# Patient Record
Sex: FEMALE | Race: WHITE | NOT HISPANIC OR LATINO | URBAN - NONMETROPOLITAN AREA
[De-identification: names, ages, dates, MRNs, and addresses within clinical notes are randomized per-mention and may not be internally consistent; named-entity substitution may affect disease eponyms.]

---

## 2019-09-10 ENCOUNTER — HISTORICAL (OUTPATIENT)
Dept: ADMINISTRATIVE | Facility: HOSPITAL | Age: 39
End: 2019-09-10

## 2021-01-12 LAB
HUMAN PAPILLOMAVIRUS (HPV): NORMAL
PAP RECOMMENDATION EXT: NORMAL
PAP SMEAR: NORMAL
POC BETA-HCG (QUAL): NEGATIVE

## 2022-04-10 ENCOUNTER — HISTORICAL (OUTPATIENT)
Dept: ADMINISTRATIVE | Facility: HOSPITAL | Age: 42
End: 2022-04-10

## 2022-04-28 VITALS
DIASTOLIC BLOOD PRESSURE: 66 MMHG | WEIGHT: 111 LBS | OXYGEN SATURATION: 99 % | SYSTOLIC BLOOD PRESSURE: 112 MMHG | HEIGHT: 62 IN | BODY MASS INDEX: 20.43 KG/M2

## 2022-09-21 ENCOUNTER — HISTORICAL (OUTPATIENT)
Dept: ADMINISTRATIVE | Facility: HOSPITAL | Age: 42
End: 2022-09-21

## 2023-01-25 ENCOUNTER — DOCUMENTATION ONLY (OUTPATIENT)
Dept: ADMINISTRATIVE | Facility: HOSPITAL | Age: 43
End: 2023-01-25
Payer: COMMERCIAL

## 2023-02-13 ENCOUNTER — OFFICE VISIT (OUTPATIENT)
Dept: FAMILY MEDICINE | Facility: CLINIC | Age: 43
End: 2023-02-13
Payer: COMMERCIAL

## 2023-02-13 VITALS
TEMPERATURE: 98 F | RESPIRATION RATE: 20 BRPM | HEIGHT: 62 IN | HEART RATE: 81 BPM | DIASTOLIC BLOOD PRESSURE: 68 MMHG | OXYGEN SATURATION: 100 % | BODY MASS INDEX: 20.43 KG/M2 | SYSTOLIC BLOOD PRESSURE: 128 MMHG | WEIGHT: 111 LBS

## 2023-02-13 DIAGNOSIS — B37.31 VAGINA, CANDIDIASIS: Primary | ICD-10-CM

## 2023-02-13 DIAGNOSIS — R39.9 UTI SYMPTOMS: ICD-10-CM

## 2023-02-13 DIAGNOSIS — Z72.51 HIGH RISK SEXUAL BEHAVIOR, UNSPECIFIED TYPE: ICD-10-CM

## 2023-02-13 DIAGNOSIS — F41.9 ANXIETY: ICD-10-CM

## 2023-02-13 PROBLEM — E78.5 HYPERLIPIDEMIA: Status: ACTIVE | Noted: 2023-02-13

## 2023-02-13 PROBLEM — H60.549 ECZEMA OF EXTERNAL AUDITORY CANAL: Status: ACTIVE | Noted: 2023-02-13

## 2023-02-13 PROBLEM — H69.90 DYSFUNCTION OF EUSTACHIAN TUBE: Status: ACTIVE | Noted: 2023-02-13

## 2023-02-13 PROBLEM — J30.9 ALLERGIC RHINITIS: Status: ACTIVE | Noted: 2023-02-13

## 2023-02-13 LAB
BILIRUB SERPL-MCNC: NORMAL MG/DL
BLOOD URINE, POC: NEGATIVE
CLARITY, POC UA: NORMAL
COLOR, POC UA: NORMAL
GLUCOSE UR QL STRIP: NEGATIVE
KETONES UR QL STRIP: NEGATIVE
LEUKOCYTE ESTERASE URINE, POC: NEGATIVE
NITRITE, POC UA: NEGATIVE
PH, POC UA: 6
PROTEIN, POC: NORMAL
SPECIFIC GRAVITY, POC UA: >=1.03
UROBILINOGEN, POC UA: NORMAL

## 2023-02-13 PROCEDURE — 3078F DIAST BP <80 MM HG: CPT | Mod: CPTII,,, | Performed by: NURSE PRACTITIONER

## 2023-02-13 PROCEDURE — 3008F BODY MASS INDEX DOCD: CPT | Mod: CPTII,,, | Performed by: NURSE PRACTITIONER

## 2023-02-13 PROCEDURE — 81002 POCT URINE DIPSTICK WITHOUT MICROSCOPE: ICD-10-PCS | Mod: ,,, | Performed by: NURSE PRACTITIONER

## 2023-02-13 PROCEDURE — 99213 OFFICE O/P EST LOW 20 MIN: CPT | Mod: 25,,, | Performed by: NURSE PRACTITIONER

## 2023-02-13 PROCEDURE — 96372 PR INJECTION,THERAP/PROPH/DIAG2ST, IM OR SUBCUT: ICD-10-PCS | Mod: ,,, | Performed by: NURSE PRACTITIONER

## 2023-02-13 PROCEDURE — 3078F PR MOST RECENT DIASTOLIC BLOOD PRESSURE < 80 MM HG: ICD-10-PCS | Mod: CPTII,,, | Performed by: NURSE PRACTITIONER

## 2023-02-13 PROCEDURE — 96372 THER/PROPH/DIAG INJ SC/IM: CPT | Mod: ,,, | Performed by: NURSE PRACTITIONER

## 2023-02-13 PROCEDURE — 1159F MED LIST DOCD IN RCRD: CPT | Mod: CPTII,,, | Performed by: NURSE PRACTITIONER

## 2023-02-13 PROCEDURE — 3074F PR MOST RECENT SYSTOLIC BLOOD PRESSURE < 130 MM HG: ICD-10-PCS | Mod: CPTII,,, | Performed by: NURSE PRACTITIONER

## 2023-02-13 PROCEDURE — 1159F PR MEDICATION LIST DOCUMENTED IN MEDICAL RECORD: ICD-10-PCS | Mod: CPTII,,, | Performed by: NURSE PRACTITIONER

## 2023-02-13 PROCEDURE — 3074F SYST BP LT 130 MM HG: CPT | Mod: CPTII,,, | Performed by: NURSE PRACTITIONER

## 2023-02-13 PROCEDURE — 81002 URINALYSIS NONAUTO W/O SCOPE: CPT | Mod: ,,, | Performed by: NURSE PRACTITIONER

## 2023-02-13 PROCEDURE — 3008F PR BODY MASS INDEX (BMI) DOCUMENTED: ICD-10-PCS | Mod: CPTII,,, | Performed by: NURSE PRACTITIONER

## 2023-02-13 PROCEDURE — 1160F PR REVIEW ALL MEDS BY PRESCRIBER/CLIN PHARMACIST DOCUMENTED: ICD-10-PCS | Mod: CPTII,,, | Performed by: NURSE PRACTITIONER

## 2023-02-13 PROCEDURE — 1160F RVW MEDS BY RX/DR IN RCRD: CPT | Mod: CPTII,,, | Performed by: NURSE PRACTITIONER

## 2023-02-13 PROCEDURE — 99213 PR OFFICE/OUTPT VISIT, EST, LEVL III, 20-29 MIN: ICD-10-PCS | Mod: 25,,, | Performed by: NURSE PRACTITIONER

## 2023-02-13 RX ORDER — CEFTRIAXONE 1 G/1
1 INJECTION, POWDER, FOR SOLUTION INTRAMUSCULAR; INTRAVENOUS
Status: COMPLETED | OUTPATIENT
Start: 2023-02-13 | End: 2023-02-13

## 2023-02-13 RX ORDER — NYSTATIN 100000 U/G
CREAM TOPICAL 2 TIMES DAILY
Qty: 30 G | Refills: 0 | Status: SHIPPED | OUTPATIENT
Start: 2023-02-13 | End: 2023-03-13

## 2023-02-13 RX ORDER — ATORVASTATIN CALCIUM 20 MG/1
20 TABLET, FILM COATED ORAL
COMMUNITY
Start: 2022-11-09 | End: 2023-03-15

## 2023-02-13 RX ORDER — FLUCONAZOLE 150 MG/1
150 TABLET ORAL DAILY
Qty: 2 TABLET | Refills: 0 | Status: SHIPPED | OUTPATIENT
Start: 2023-02-13 | End: 2023-02-15

## 2023-02-13 RX ADMIN — CEFTRIAXONE 1 G: 1 INJECTION, POWDER, FOR SOLUTION INTRAMUSCULAR; INTRAVENOUS at 08:02

## 2023-02-13 NOTE — PROGRESS NOTES
Subjective:       Patient ID: Barb Telles is a 42 y.o. female.    Chief Complaint: Urinary Tract Infection (Frequent uti, burning. Started last week.)    She does have history of recurrent UTI's. No new laundry detergent, soda, and intaking water. She does have occasional bladder spasm and suprapubic tenderness.     Vaginal Itching  The patient's primary symptoms include genital itching and vaginal discharge. This is a new problem. The current episode started in the past 7 days. The problem occurs constantly. The problem has been gradually worsening. Pertinent negatives include no abdominal pain, back pain, chills, constipation, diarrhea, discolored urine, fever, flank pain, headaches, nausea or sore throat. The vaginal discharge was thick and white. Nothing aggravates the symptoms. She is sexually active. No, her partner does not have an STD. Her past medical history is significant for vaginosis.   Review of Systems   Constitutional:  Negative for activity change, appetite change, chills, fever and unexpected weight change.   HENT:  Negative for ear pain, hearing loss, sinus pressure/congestion and sore throat.    Gastrointestinal:  Negative for abdominal distention, abdominal pain, constipation, diarrhea and nausea.   Endocrine: Negative for cold intolerance and heat intolerance.   Genitourinary:  Positive for vaginal discharge. Negative for difficulty urinating, flank pain, menstrual irregularity and vaginal dryness.   Musculoskeletal:  Negative for arthralgias and back pain.   Allergic/Immunologic: Negative for environmental allergies, food allergies and immunocompromised state.   Neurological:  Negative for dizziness, weakness, headaches, coordination difficulties, memory loss and coordination difficulties.   Psychiatric/Behavioral:  Negative for sleep disturbance and suicidal ideas. The patient is not nervous/anxious.        Objective:      Physical Exam  Vitals and nursing note reviewed.    Constitutional:       Appearance: Normal appearance.   HENT:      Head: Normocephalic and atraumatic.      Right Ear: Tympanic membrane, ear canal and external ear normal.      Left Ear: Tympanic membrane, ear canal and external ear normal.      Nose: Nose normal.      Mouth/Throat:      Mouth: Mucous membranes are moist.      Pharynx: Oropharynx is clear.   Eyes:      Extraocular Movements: Extraocular movements intact.      Conjunctiva/sclera: Conjunctivae normal.      Pupils: Pupils are equal, round, and reactive to light.   Cardiovascular:      Rate and Rhythm: Normal rate and regular rhythm.      Pulses: Normal pulses.      Heart sounds: Normal heart sounds.   Pulmonary:      Effort: Pulmonary effort is normal.      Breath sounds: Normal breath sounds.   Abdominal:      General: Abdomen is flat. Bowel sounds are normal.      Palpations: Abdomen is soft.      Tenderness: There is abdominal tenderness in the suprapubic area.   Musculoskeletal:         General: Normal range of motion.      Cervical back: Normal range of motion.   Skin:     General: Skin is warm and dry.      Capillary Refill: Capillary refill takes less than 2 seconds.   Neurological:      General: No focal deficit present.      Mental Status: She is alert.   Psychiatric:         Mood and Affect: Mood normal.         Behavior: Behavior normal.         Thought Content: Thought content normal.         Judgment: Judgment normal.       Assessment/Plan:     Vitals:    02/13/23 0719   BP: 128/68   Pulse: 81   Resp: 20   Temp: 98.2 °F (36.8 °C)        1. Vagina, candidiasis  Assessment & Plan:  Diflucan 150mg [id number]     Orders:  -     fluconazole (DIFLUCAN) 150 MG Tab; Take 1 tablet (150 mg total) by mouth once daily. for 2 doses  Dispense: 2 tablet; Refill: 0  -     nystatin (MYCOSTATIN) cream; Apply topically 2 (two) times daily. for 14 days  Dispense: 30 g; Refill: 0    2. Anxiety  Assessment & Plan:  Will notify if worsens   With increase life  stressors  Denies medication or therapy       3. UTI symptoms  Assessment & Plan:  Negative luek and nitrates     Orders:  -     POCT URINE DIPSTICK WITHOUT MICROSCOPE           Follow up in about 4 weeks (around 3/13/2023) for Clinic Follow Up.   Discussed the diagnosis, prognosis, plan of care, chronic nature of and indications for, risks and benefits of treatment for conditions.  Continue all medications as prescribed unless otherwise noted.   Call if patient develops other symptoms or if not better in 2-3 days and sooner if worse. Emphasized the importance of compliance with all recommendations, including medication use and timely follow up. Instructed to return as noted be or sooner if patient develops any other problems or if there are any other additional questions or concerns.

## 2023-02-15 LAB
C TRACH RRNA SPEC QL NAA+PROBE: NEGATIVE
N GONORRHOEA RRNA SPEC QL NAA+PROBE: NEGATIVE

## 2023-02-16 ENCOUNTER — TELEPHONE (OUTPATIENT)
Dept: FAMILY MEDICINE | Facility: CLINIC | Age: 43
End: 2023-02-16
Payer: COMMERCIAL

## 2023-02-16 DIAGNOSIS — F41.9 SEVERE ANXIETY: Primary | ICD-10-CM

## 2023-02-16 RX ORDER — HYDROXYZINE PAMOATE 25 MG/1
25 CAPSULE ORAL EVERY 6 HOURS PRN
COMMUNITY
End: 2023-02-16 | Stop reason: SDUPTHER

## 2023-02-16 RX ORDER — HYDROXYZINE PAMOATE 25 MG/1
25 CAPSULE ORAL 4 TIMES DAILY
Qty: 84 CAPSULE | Refills: 0 | Status: SHIPPED | OUTPATIENT
Start: 2023-02-16 | End: 2023-03-09

## 2023-02-16 NOTE — TELEPHONE ENCOUNTER
----- Message from Sarah Henderson sent at 2/16/2023  3:27 PM CST -----  Regarding: Call out meds      Pt called crying that she is still having panic attacks and that she was told to call in.  I spoke to Meenakshi and she said that some thing will be called out for the patient.      [address]

## 2023-02-16 NOTE — TELEPHONE ENCOUNTER
----- Message from JOSH Durant sent at 2/15/2023  7:15 AM CST -----  Notify negative chlamydia and gonorrhea. Don't have trich result

## 2023-03-13 ENCOUNTER — OFFICE VISIT (OUTPATIENT)
Dept: FAMILY MEDICINE | Facility: CLINIC | Age: 43
End: 2023-03-13
Payer: COMMERCIAL

## 2023-03-13 VITALS
HEART RATE: 96 BPM | BODY MASS INDEX: 20.24 KG/M2 | DIASTOLIC BLOOD PRESSURE: 72 MMHG | WEIGHT: 110 LBS | TEMPERATURE: 97 F | HEIGHT: 62 IN | OXYGEN SATURATION: 100 % | RESPIRATION RATE: 18 BRPM | SYSTOLIC BLOOD PRESSURE: 124 MMHG

## 2023-03-13 DIAGNOSIS — H69.93 DYSFUNCTION OF BOTH EUSTACHIAN TUBES: ICD-10-CM

## 2023-03-13 DIAGNOSIS — E55.9 VITAMIN D DEFICIENCY: ICD-10-CM

## 2023-03-13 DIAGNOSIS — Z11.59 ENCOUNTER FOR HEPATITIS C SCREENING TEST FOR LOW RISK PATIENT: ICD-10-CM

## 2023-03-13 DIAGNOSIS — F17.210 CIGARETTE NICOTINE DEPENDENCE WITHOUT COMPLICATION: ICD-10-CM

## 2023-03-13 DIAGNOSIS — Z79.899 LONG TERM USE OF DRUG: ICD-10-CM

## 2023-03-13 DIAGNOSIS — H60.549 ECZEMA OF EXTERNAL AUDITORY CANAL: Primary | ICD-10-CM

## 2023-03-13 DIAGNOSIS — J30.2 OTHER SEASONAL ALLERGIC RHINITIS: ICD-10-CM

## 2023-03-13 DIAGNOSIS — F43.21 GRIEVING: ICD-10-CM

## 2023-03-13 DIAGNOSIS — F41.9 ANXIETY: ICD-10-CM

## 2023-03-13 DIAGNOSIS — E78.5 HYPERLIPIDEMIA, UNSPECIFIED HYPERLIPIDEMIA TYPE: ICD-10-CM

## 2023-03-13 PROBLEM — E78.2 MIXED HYPERLIPIDEMIA: Status: ACTIVE | Noted: 2023-02-13

## 2023-03-13 PROBLEM — R39.9 UTI SYMPTOMS: Status: RESOLVED | Noted: 2023-02-13 | Resolved: 2023-03-13

## 2023-03-13 PROCEDURE — 3008F PR BODY MASS INDEX (BMI) DOCUMENTED: ICD-10-PCS | Mod: CPTII,,, | Performed by: NURSE PRACTITIONER

## 2023-03-13 PROCEDURE — 99213 PR OFFICE/OUTPT VISIT, EST, LEVL III, 20-29 MIN: ICD-10-PCS | Mod: ,,, | Performed by: NURSE PRACTITIONER

## 2023-03-13 PROCEDURE — 1159F PR MEDICATION LIST DOCUMENTED IN MEDICAL RECORD: ICD-10-PCS | Mod: CPTII,,, | Performed by: NURSE PRACTITIONER

## 2023-03-13 PROCEDURE — 1159F MED LIST DOCD IN RCRD: CPT | Mod: CPTII,,, | Performed by: NURSE PRACTITIONER

## 2023-03-13 PROCEDURE — 1160F PR REVIEW ALL MEDS BY PRESCRIBER/CLIN PHARMACIST DOCUMENTED: ICD-10-PCS | Mod: CPTII,,, | Performed by: NURSE PRACTITIONER

## 2023-03-13 PROCEDURE — 3074F SYST BP LT 130 MM HG: CPT | Mod: CPTII,,, | Performed by: NURSE PRACTITIONER

## 2023-03-13 PROCEDURE — 3074F PR MOST RECENT SYSTOLIC BLOOD PRESSURE < 130 MM HG: ICD-10-PCS | Mod: CPTII,,, | Performed by: NURSE PRACTITIONER

## 2023-03-13 PROCEDURE — 1160F RVW MEDS BY RX/DR IN RCRD: CPT | Mod: CPTII,,, | Performed by: NURSE PRACTITIONER

## 2023-03-13 PROCEDURE — 99213 OFFICE O/P EST LOW 20 MIN: CPT | Mod: ,,, | Performed by: NURSE PRACTITIONER

## 2023-03-13 PROCEDURE — 3078F DIAST BP <80 MM HG: CPT | Mod: CPTII,,, | Performed by: NURSE PRACTITIONER

## 2023-03-13 PROCEDURE — 3008F BODY MASS INDEX DOCD: CPT | Mod: CPTII,,, | Performed by: NURSE PRACTITIONER

## 2023-03-13 PROCEDURE — 3078F PR MOST RECENT DIASTOLIC BLOOD PRESSURE < 80 MM HG: ICD-10-PCS | Mod: CPTII,,, | Performed by: NURSE PRACTITIONER

## 2023-03-13 RX ORDER — PSEUDOEPHEDRINE HYDROCHLORIDE 60 MG/1
60 TABLET ORAL 2 TIMES DAILY PRN
Qty: 32 TABLET | Refills: 0 | Status: SHIPPED | OUTPATIENT
Start: 2023-03-13 | End: 2023-04-12

## 2023-03-13 NOTE — PROGRESS NOTES
Subjective:       Patient ID: Barb Telles is a 42 y.o. female.    Chief Complaint: 1 month f/u UTI (No symptoms), Nasal Congestion, and Headache    Increased sinus congestion and pressure in past several weeks. Out of medications for past two weeks. Also increased allergies with sinus congestion for the past several months she is fasting at this time and had been taking the Lipitor until 2 weeks ago when she suddenly lost her father to a probable MI and her grandmother is currently given last rites with hospice and more quiet.  She does notice that she has enough people to be able to talk to at this time even though she is more sad.  She would like to have her labs checked for any kind of screening if any potentials but she is low risk for hepatitis-C at this time.    Hyperlipidemia  This is a chronic problem. The current episode started more than 1 year ago. There are no known factors aggravating her hyperlipidemia. Pertinent negatives include no chest pain or shortness of breath. Current antihyperlipidemic treatment includes statins.   Review of Systems   Constitutional:  Negative for activity change and appetite change.   HENT:  Positive for postnasal drip. Negative for nasal congestion, ear discharge, ear pain, trouble swallowing and voice change.    Eyes: Negative.  Negative for visual disturbance.   Respiratory: Negative.  Negative for chest tightness, shortness of breath and wheezing.    Cardiovascular:  Negative for chest pain, palpitations and leg swelling.   Endocrine: Negative for cold intolerance, heat intolerance and polyuria.   Genitourinary:  Negative for bladder incontinence, difficulty urinating, flank pain, frequency, vaginal discharge and vaginal dryness.   Allergic/Immunologic: Negative for environmental allergies and food allergies.   Neurological:  Negative for vertigo, tremors, seizures, syncope, light-headedness, headaches, coordination difficulties and coordination difficulties.  "  Hematological:  Negative for adenopathy. Does not bruise/bleed easily.   Psychiatric/Behavioral:  Negative for agitation, sleep disturbance and suicidal ideas.        Objective:      Vitals:    03/13/23 0854   BP: 124/72   Pulse: 96   Resp: 18   Temp: 97 °F (36.1 °C)   TempSrc: Temporal   SpO2: 100%   Weight: 49.9 kg (110 lb)   Height: 5' 2" (1.575 m)       Physical Exam  Vitals and nursing note reviewed.   Constitutional:       Appearance: Normal appearance. She is normal weight.   HENT:      Head: Normocephalic.      Right Ear: Hearing, ear canal and external ear normal. Tympanic membrane is bulging.      Left Ear: Hearing, ear canal and external ear normal. Tympanic membrane is bulging.      Nose: Congestion and rhinorrhea present.      Mouth/Throat:      Mouth: Mucous membranes are dry.      Pharynx: Oropharynx is clear.   Eyes:      Extraocular Movements: Extraocular movements intact.      Conjunctiva/sclera: Conjunctivae normal.   Cardiovascular:      Rate and Rhythm: Normal rate and regular rhythm.      Pulses: Normal pulses.   Pulmonary:      Effort: Pulmonary effort is normal.      Breath sounds: Normal breath sounds.   Abdominal:      General: Abdomen is flat. Bowel sounds are normal.      Palpations: Abdomen is soft.   Musculoskeletal:         General: Normal range of motion.      Cervical back: Normal range of motion.   Skin:     General: Skin is warm and dry.   Neurological:      General: No focal deficit present.      Mental Status: She is alert.   Psychiatric:         Mood and Affect: Mood normal.       Assessment/Plan:     1. Eczema of external auditory canal    2. Hyperlipidemia, unspecified hyperlipidemia type    3. Other seasonal allergic rhinitis  -     pseudoephedrine (SUDAFED) 60 MG tablet; Take 1 tablet (60 mg total) by mouth 2 (two) times daily as needed for Congestion.  Dispense: 32 tablet; Refill: 0    4. Anxiety    5. Grieving  Assessment & Plan:  Grandmother currently with hospice. " Recently father sudden death.       6. Dysfunction of both eustachian tubes    7. Cigarette nicotine dependence without complication  Overview:  Declines smoking cessation program or assistance at this time.          Follow up in about 3 months (around 6/13/2023).          Discussed the diagnosis, prognosis, plan of care, chronic nature of and indications for, risks and benefits of treatment for conditions.  Continue all medications as prescribed unless otherwise noted.   Call if patient develops other symptoms or if not better in 2-3 days and sooner if worse. Emphasized the importance of compliance with all recommendations, including medication use and timely follow up. Instructed to return as noted be or sooner if patient develops any other problems or if there are any other additional questions or concerns.

## 2023-03-14 LAB
25(OH)D3+25(OH)D2 SERPL-MCNC: 10.4 NG/ML (ref 30–100)
ALBUMIN SERPL-MCNC: 4.5 G/DL (ref 3.8–4.8)
ALBUMIN/GLOB SERPL: 2 {RATIO} (ref 1.2–2.2)
ALP SERPL-CCNC: 50 IU/L (ref 44–121)
ALT SERPL-CCNC: 13 IU/L (ref 0–32)
AST SERPL-CCNC: 14 IU/L (ref 0–40)
BASOPHILS # BLD AUTO: 0 X10E3/UL (ref 0–0.2)
BASOPHILS NFR BLD AUTO: 0 %
BILIRUB SERPL-MCNC: 0.5 MG/DL (ref 0–1.2)
BUN SERPL-MCNC: 8 MG/DL (ref 6–24)
BUN/CREAT SERPL: 16 (ref 9–23)
CALCIUM SERPL-MCNC: 9.5 MG/DL (ref 8.7–10.2)
CHLORIDE SERPL-SCNC: 105 MMOL/L (ref 96–106)
CHOLEST SERPL-MCNC: 247 MG/DL (ref 100–199)
CO2 SERPL-SCNC: 19 MMOL/L (ref 20–29)
CREAT SERPL-MCNC: 0.51 MG/DL (ref 0.57–1)
EOSINOPHIL # BLD AUTO: 0.5 X10E3/UL (ref 0–0.4)
EOSINOPHIL NFR BLD AUTO: 7 %
ERYTHROCYTE [DISTWIDTH] IN BLOOD BY AUTOMATED COUNT: 13 % (ref 11.7–15.4)
EST. GFR  (NO RACE VARIABLE): 120 ML/MIN/1.73
GLOBULIN SER CALC-MCNC: 2.2 G/DL (ref 1.5–4.5)
GLUCOSE SERPL-MCNC: 90 MG/DL (ref 70–99)
HCT VFR BLD AUTO: 40.3 % (ref 34–46.6)
HCV IGG SERPL QL IA: NON REACTIVE
HDLC SERPL-MCNC: 44 MG/DL
HGB BLD-MCNC: 13.7 G/DL (ref 11.1–15.9)
IMM GRANULOCYTES NFR BLD AUTO: 0 %
LDLC SERPL CALC-MCNC: 187 MG/DL (ref 0–99)
LYMPHOCYTES # BLD AUTO: 1.6 X10E3/UL (ref 0.7–3.1)
LYMPHOCYTES NFR BLD AUTO: 21 %
MCH RBC QN AUTO: 29.5 PG (ref 26.6–33)
MCHC RBC AUTO-ENTMCNC: 34 G/DL (ref 31.5–35.7)
MCV RBC AUTO: 87 FL (ref 79–97)
MONOCYTES # BLD AUTO: 0.5 X10E3/UL (ref 0.1–0.9)
MONOCYTES NFR BLD AUTO: 6 %
NEUTROPHILS # BLD AUTO: 4.9 X10E3/UL (ref 1.4–7)
NEUTROPHILS NFR BLD AUTO: 66 %
PLATELET # BLD AUTO: 191 X10E3/UL (ref 150–450)
POTASSIUM SERPL-SCNC: 4.1 MMOL/L (ref 3.5–5.2)
PROT SERPL-MCNC: 6.7 G/DL (ref 6–8.5)
RBC # BLD AUTO: 4.64 X10E6/UL (ref 3.77–5.28)
SODIUM SERPL-SCNC: 140 MMOL/L (ref 134–144)
TRIGL SERPL-MCNC: 90 MG/DL (ref 0–149)
TSH SERPL DL<=0.005 MIU/L-ACNC: 1.89 UIU/ML (ref 0.45–4.5)
VLDLC SERPL CALC-MCNC: 16 MG/DL (ref 5–40)
WBC # BLD AUTO: 7.5 X10E3/UL (ref 3.4–10.8)

## 2023-03-14 NOTE — PROGRESS NOTES
Normal lab with very elevated cholesterol LDL at 187 goal <100, increase lipitor 40 mg qhs,  vitamin D only 10, begin 50,000 twice week for 90 days. Allergies otherwise normal cbc, negative Hep C infection. Thyroid good, normal liver, kidney, no diabetes, no anemia, recheck CMP, FLP, vitamin D in 3 month unless problems.

## 2023-03-15 ENCOUNTER — TELEPHONE (OUTPATIENT)
Dept: FAMILY MEDICINE | Facility: CLINIC | Age: 43
End: 2023-03-15
Payer: COMMERCIAL

## 2023-03-15 DIAGNOSIS — E55.9 VITAMIN D DEFICIENCY: ICD-10-CM

## 2023-03-15 DIAGNOSIS — E78.2 MIXED HYPERLIPIDEMIA: Primary | ICD-10-CM

## 2023-03-15 RX ORDER — ATORVASTATIN CALCIUM 40 MG/1
40 TABLET, FILM COATED ORAL DAILY
Qty: 90 TABLET | Refills: 0 | Status: SHIPPED | OUTPATIENT
Start: 2023-03-15 | End: 2024-01-24

## 2023-03-15 RX ORDER — ERGOCALCIFEROL 1.25 MG/1
50000 CAPSULE ORAL
Qty: 8 CAPSULE | Refills: 0 | Status: SHIPPED | OUTPATIENT
Start: 2023-03-16 | End: 2023-06-14

## 2023-03-15 NOTE — TELEPHONE ENCOUNTER
----- Message from Lucero English DNP sent at 3/14/2023  6:21 PM CDT -----  Normal lab with very elevated cholesterol LDL at 187 goal <100, increase lipitor 40 mg qhs,  vitamin D only 10, begin 50,000 twice week for 90 days. Allergies otherwise normal cbc, negative Hep C infection. Thyroid good, normal liver, kidney, no diabetes, no anemia, recheck CMP, FLP, vitamin D in 3 month unless problems.

## 2023-11-29 ENCOUNTER — OFFICE VISIT (OUTPATIENT)
Dept: FAMILY MEDICINE | Facility: CLINIC | Age: 43
End: 2023-11-29
Payer: COMMERCIAL

## 2023-11-29 VITALS
OXYGEN SATURATION: 99 % | HEART RATE: 107 BPM | RESPIRATION RATE: 20 BRPM | BODY MASS INDEX: 19.88 KG/M2 | SYSTOLIC BLOOD PRESSURE: 102 MMHG | HEIGHT: 62 IN | TEMPERATURE: 99 F | WEIGHT: 108 LBS | DIASTOLIC BLOOD PRESSURE: 68 MMHG

## 2023-11-29 DIAGNOSIS — E78.2 MIXED HYPERLIPIDEMIA: ICD-10-CM

## 2023-11-29 DIAGNOSIS — J06.9 UPPER RESPIRATORY TRACT INFECTION, UNSPECIFIED TYPE: Primary | ICD-10-CM

## 2023-11-29 DIAGNOSIS — E55.9 VITAMIN D DEFICIENCY: ICD-10-CM

## 2023-11-29 DIAGNOSIS — F17.210 CIGARETTE NICOTINE DEPENDENCE WITHOUT COMPLICATION: ICD-10-CM

## 2023-11-29 PROCEDURE — 3074F PR MOST RECENT SYSTOLIC BLOOD PRESSURE < 130 MM HG: ICD-10-PCS | Mod: CPTII,,, | Performed by: NURSE PRACTITIONER

## 2023-11-29 PROCEDURE — 96372 THER/PROPH/DIAG INJ SC/IM: CPT | Mod: ,,, | Performed by: NURSE PRACTITIONER

## 2023-11-29 PROCEDURE — 3008F BODY MASS INDEX DOCD: CPT | Mod: CPTII,,, | Performed by: NURSE PRACTITIONER

## 2023-11-29 PROCEDURE — 3078F DIAST BP <80 MM HG: CPT | Mod: CPTII,,, | Performed by: NURSE PRACTITIONER

## 2023-11-29 PROCEDURE — 3074F SYST BP LT 130 MM HG: CPT | Mod: CPTII,,, | Performed by: NURSE PRACTITIONER

## 2023-11-29 PROCEDURE — 3078F PR MOST RECENT DIASTOLIC BLOOD PRESSURE < 80 MM HG: ICD-10-PCS | Mod: CPTII,,, | Performed by: NURSE PRACTITIONER

## 2023-11-29 PROCEDURE — 99213 OFFICE O/P EST LOW 20 MIN: CPT | Mod: 25,,, | Performed by: NURSE PRACTITIONER

## 2023-11-29 PROCEDURE — 3008F PR BODY MASS INDEX (BMI) DOCUMENTED: ICD-10-PCS | Mod: CPTII,,, | Performed by: NURSE PRACTITIONER

## 2023-11-29 PROCEDURE — 96372 PR INJECTION,THERAP/PROPH/DIAG2ST, IM OR SUBCUT: ICD-10-PCS | Mod: ,,, | Performed by: NURSE PRACTITIONER

## 2023-11-29 PROCEDURE — 99213 PR OFFICE/OUTPT VISIT, EST, LEVL III, 20-29 MIN: ICD-10-PCS | Mod: 25,,, | Performed by: NURSE PRACTITIONER

## 2023-11-29 PROCEDURE — 1159F MED LIST DOCD IN RCRD: CPT | Mod: CPTII,,, | Performed by: NURSE PRACTITIONER

## 2023-11-29 PROCEDURE — 1159F PR MEDICATION LIST DOCUMENTED IN MEDICAL RECORD: ICD-10-PCS | Mod: CPTII,,, | Performed by: NURSE PRACTITIONER

## 2023-11-29 RX ORDER — DEXAMETHASONE SODIUM PHOSPHATE 100 MG/10ML
10 INJECTION INTRAMUSCULAR; INTRAVENOUS
Status: COMPLETED | OUTPATIENT
Start: 2023-11-29 | End: 2023-11-29

## 2023-11-29 RX ADMIN — DEXAMETHASONE SODIUM PHOSPHATE 10 MG: 100 INJECTION INTRAMUSCULAR; INTRAVENOUS at 02:11

## 2023-11-29 NOTE — PROGRESS NOTES
"Subjective:       Patient ID: Barb Telles is a 43 y.o. female.    Chief Complaint: Cough (Runny nose and congestion)    Patient presents with complaint of sinus congestion.  Began approximately 9 days ago with sinus congestion low-grade fever cough with severe rhinorrhea and it did better the improve but stopped taking medicine now still experiencing especially frontal sinus congestion.  Not running a fever only a slight cough smoking 1/2 pack a day but does not feel any chest congestion at this time.      Review of Systems   Constitutional:  Negative for activity change and appetite change.   HENT:  Positive for nasal congestion and rhinorrhea. Negative for trouble swallowing and voice change.    Eyes: Negative.  Negative for visual disturbance.   Respiratory: Negative.  Negative for chest tightness, shortness of breath and wheezing.    Cardiovascular:  Negative for chest pain, palpitations and leg swelling.   Gastrointestinal: Negative.    Endocrine: Negative for cold intolerance, heat intolerance and polyuria.   Genitourinary:  Negative for bladder incontinence, difficulty urinating, flank pain and frequency.   Allergic/Immunologic: Negative for environmental allergies and food allergies.   Neurological:  Negative for vertigo, tremors, seizures, syncope, light-headedness, headaches and coordination difficulties.   Hematological:  Negative for adenopathy. Does not bruise/bleed easily.   Psychiatric/Behavioral:  Negative for agitation, sleep disturbance and suicidal ideas.          Objective:      Vitals:    11/29/23 1432   BP: 102/68   Pulse: 107   Resp: 20   Temp: 99.2 °F (37.3 °C)   SpO2: 99%   Weight: 49 kg (108 lb)   Height: 5' 2" (1.575 m)       Physical Exam  Vitals and nursing note reviewed.   Constitutional:       Appearance: Normal appearance.   HENT:      Head: Normocephalic.      Nose: Congestion and rhinorrhea present.      Mouth/Throat:      Mouth: Mucous membranes are dry.   Eyes:      " Extraocular Movements: Extraocular movements intact.      Conjunctiva/sclera: Conjunctivae normal.   Cardiovascular:      Rate and Rhythm: Normal rate and regular rhythm.      Pulses: Normal pulses.   Pulmonary:      Effort: Pulmonary effort is normal.      Breath sounds: Normal breath sounds.   Abdominal:      General: Bowel sounds are normal.      Palpations: Abdomen is soft.   Musculoskeletal:         General: Normal range of motion.      Cervical back: Normal range of motion.   Skin:     General: Skin is warm and dry.      Capillary Refill: Capillary refill takes less than 2 seconds.   Neurological:      General: No focal deficit present.      Mental Status: She is alert.   Psychiatric:         Mood and Affect: Mood normal.         Assessment/Plan:     1. Upper respiratory tract infection, unspecified type  Assessment & Plan:  Aprodine, nasal irrigation, and notify any worsening or congestion.     Orders:  -     dexAMETHasone injection 10 mg    2. Mixed hyperlipidemia  Assessment & Plan:  Restart lipitor 20 mg every other day. Add Co Q10 if needed for myalgia      3. Cigarette nicotine dependence without complication  Overview:  Declines smoking cessation program or assistance at this time.     Assessment & Plan:  Not ready to quit smoking at this time.       4. Vitamin D deficiency  Assessment & Plan:  Begin resuming vitamin D daily, recheck cmp, flp, vitamin D in 3 months.       Other orders  -     triprolidine-pseudoephedrine (APRODINE) 2.5-60 mg Tab; Take 1 tablet by mouth every 6 (six) hours as needed (nasal congestion).  Dispense: 30 tablet; Refill: 0       Follow up in about 3 months (around 2/29/2024).          Discussed the diagnosis, prognosis, plan of care, chronic nature of and indications for, risks and benefits of treatment for conditions.  Continue all medications as prescribed unless otherwise noted.   Call if patient develops other symptoms or if not better in 2-3 days and sooner if worse.  Emphasized the importance of compliance with all recommendations, including medication use and timely follow up. Instructed to return as noted be or sooner if patient develops any other problems or if there are any other additional questions or concerns.

## 2024-01-24 ENCOUNTER — OFFICE VISIT (OUTPATIENT)
Dept: FAMILY MEDICINE | Facility: CLINIC | Age: 44
End: 2024-01-24
Payer: COMMERCIAL

## 2024-01-24 VITALS
WEIGHT: 108 LBS | HEART RATE: 82 BPM | OXYGEN SATURATION: 100 % | DIASTOLIC BLOOD PRESSURE: 68 MMHG | HEIGHT: 62 IN | TEMPERATURE: 98 F | SYSTOLIC BLOOD PRESSURE: 128 MMHG | BODY MASS INDEX: 19.88 KG/M2 | RESPIRATION RATE: 18 BRPM

## 2024-01-24 DIAGNOSIS — E78.2 MIXED HYPERLIPIDEMIA: ICD-10-CM

## 2024-01-24 DIAGNOSIS — L02.91 ABSCESS: Primary | ICD-10-CM

## 2024-01-24 DIAGNOSIS — E55.9 VITAMIN D DEFICIENCY: ICD-10-CM

## 2024-01-24 PROCEDURE — 3008F BODY MASS INDEX DOCD: CPT | Mod: CPTII,,, | Performed by: NURSE PRACTITIONER

## 2024-01-24 PROCEDURE — 3074F SYST BP LT 130 MM HG: CPT | Mod: CPTII,,, | Performed by: NURSE PRACTITIONER

## 2024-01-24 PROCEDURE — 1159F MED LIST DOCD IN RCRD: CPT | Mod: CPTII,,, | Performed by: NURSE PRACTITIONER

## 2024-01-24 PROCEDURE — 3078F DIAST BP <80 MM HG: CPT | Mod: CPTII,,, | Performed by: NURSE PRACTITIONER

## 2024-01-24 PROCEDURE — 99213 OFFICE O/P EST LOW 20 MIN: CPT | Mod: ,,, | Performed by: NURSE PRACTITIONER

## 2024-01-24 RX ORDER — CHOLECALCIFEROL (VITAMIN D3) 125 MCG
5000 CAPSULE ORAL DAILY
Qty: 90 CAPSULE | Refills: 0 | Status: SHIPPED | OUTPATIENT
Start: 2024-01-24

## 2024-01-24 RX ORDER — MUPIROCIN 20 MG/G
OINTMENT TOPICAL 3 TIMES DAILY
Qty: 22 G | Refills: 0 | Status: SHIPPED | OUTPATIENT
Start: 2024-01-24 | End: 2024-05-06

## 2024-01-24 RX ORDER — SULFAMETHOXAZOLE AND TRIMETHOPRIM 800; 160 MG/1; MG/1
1 TABLET ORAL 2 TIMES DAILY
Qty: 20 TABLET | Refills: 0 | Status: SHIPPED | OUTPATIENT
Start: 2024-01-24 | End: 2024-02-03

## 2024-01-24 RX ORDER — ATORVASTATIN CALCIUM 20 MG/1
20 TABLET, FILM COATED ORAL DAILY
Qty: 90 TABLET | Refills: 1 | Status: SHIPPED | OUTPATIENT
Start: 2024-01-24 | End: 2024-05-06

## 2024-01-24 NOTE — PROGRESS NOTES
"SUBJECTIVE:  Barb Telles is a 43 y.o. female here alone for abscess to left breast.    HPI  Patient presents with abscess to left breast with ingrown hair inside. Red, raised with pus under skin. Denies fever and pain. States only itches. Needs refill on atorvastatin.    Steffs allergies, medications, history, and problem list were updated as appropriate.    Review of Systems   Skin:  Positive for wound.      A comprehensive review of symptoms was completed and negative except as noted above.    OBJECTIVE:  Vital signs  Vitals:    01/24/24 1606   BP: 128/68   BP Location: Right arm   Patient Position: Sitting   Pulse: 82   Resp: 18   Temp: 98.4 °F (36.9 °C)   TempSrc: Temporal   SpO2: 100%   Weight: 49 kg (108 lb)   Height: 5' 2" (1.575 m)        Physical Exam  Constitutional:       Appearance: Normal appearance.   HENT:      Head: Normocephalic and atraumatic.      Nose: Nose normal.      Mouth/Throat:      Mouth: Mucous membranes are moist.      Pharynx: Oropharynx is clear.   Eyes:      Conjunctiva/sclera: Conjunctivae normal.   Cardiovascular:      Rate and Rhythm: Normal rate and regular rhythm.   Pulmonary:      Effort: Pulmonary effort is normal.      Breath sounds: Normal breath sounds.   Abdominal:      General: Bowel sounds are normal.      Palpations: Abdomen is soft.   Skin:     General: Skin is warm.      Capillary Refill: Capillary refill takes less than 2 seconds.      Comments: Dime sized superficial abscess to left breast.  Small amount of pus drained with palpation.   Neurological:      Mental Status: She is alert.   Psychiatric:         Mood and Affect: Mood normal.         Behavior: Behavior normal.         Judgment: Judgment normal.         ASSESSMENT/PLAN:    1. Abscess  Comments:  keep wound clean and dry, cover if draining then keep open to air  Orders:  -     sulfamethoxazole-trimethoprim 800-160mg (BACTRIM DS) 800-160 mg Tab; Take 1 tablet by mouth 2 (two) times daily. for 10 days  " Dispense: 20 tablet; Refill: 0  -     mupirocin (BACTROBAN) 2 % ointment; Apply topically 3 (three) times daily.  Dispense: 22 g; Refill: 0    2. Mixed hyperlipidemia  -     atorvastatin (LIPITOR) 20 MG tablet; Take 1 tablet (20 mg total) by mouth once daily.  Dispense: 90 tablet; Refill: 1    3. Vitamin D deficiency  -     cholecalciferol, vitamin D3, 125 mcg (5,000 unit) capsule; Take 1 capsule (5,000 Units total) by mouth once daily.  Dispense: 90 capsule; Refill: 0          No results found for this or any previous visit (from the past 24 hour(s)).    Follow Up:  Follow up if symptoms worsen or fail to improve.      Discussed the diagnosis, prognosis, plan of care, chronic nature of and indications for, risks and benefits of treatment for conditions.  Continue all medications as prescribed unless otherwise noted.   Call if patient develops other symptoms or if not better in 2-3 days and sooner if worse. Emphasized the importance of compliance with all recommendations, including medication use and timely follow up. Instructed to return as noted be or sooner if patient develops any other problems or if there are any other additional questions or concerns.

## 2024-02-07 ENCOUNTER — OFFICE VISIT (OUTPATIENT)
Dept: FAMILY MEDICINE | Facility: CLINIC | Age: 44
End: 2024-02-07
Payer: COMMERCIAL

## 2024-02-07 VITALS
RESPIRATION RATE: 20 BRPM | HEART RATE: 88 BPM | DIASTOLIC BLOOD PRESSURE: 71 MMHG | TEMPERATURE: 98 F | SYSTOLIC BLOOD PRESSURE: 104 MMHG | HEIGHT: 62 IN | WEIGHT: 108 LBS | OXYGEN SATURATION: 98 % | BODY MASS INDEX: 19.88 KG/M2

## 2024-02-07 DIAGNOSIS — N39.0 URINARY TRACT INFECTION WITH HEMATURIA, SITE UNSPECIFIED: ICD-10-CM

## 2024-02-07 DIAGNOSIS — R31.9 URINARY TRACT INFECTION WITH HEMATURIA, SITE UNSPECIFIED: ICD-10-CM

## 2024-02-07 DIAGNOSIS — R10.9 ABDOMINAL PAIN, UNSPECIFIED ABDOMINAL LOCATION: ICD-10-CM

## 2024-02-07 DIAGNOSIS — N39.0 RECURRENT UTI (URINARY TRACT INFECTION): Primary | ICD-10-CM

## 2024-02-07 DIAGNOSIS — B37.31 VAGINA, CANDIDIASIS: ICD-10-CM

## 2024-02-07 DIAGNOSIS — R39.9 UTI SYMPTOMS: ICD-10-CM

## 2024-02-07 LAB
BILIRUB SERPL-MCNC: NORMAL MG/DL
BLOOD URINE, POC: NORMAL
CLARITY, POC UA: CLEAR
COLOR, POC UA: YELLOW
GLUCOSE UR QL STRIP: NEGATIVE
KETONES UR QL STRIP: NEGATIVE
LEUKOCYTE ESTERASE URINE, POC: NORMAL
NITRITE, POC UA: NEGATIVE
PH, POC UA: 6
PROTEIN, POC: NORMAL
SPECIFIC GRAVITY, POC UA: >=1.03
UROBILINOGEN, POC UA: 1

## 2024-02-07 PROCEDURE — 3074F SYST BP LT 130 MM HG: CPT | Mod: CPTII,,, | Performed by: NURSE PRACTITIONER

## 2024-02-07 PROCEDURE — 3008F BODY MASS INDEX DOCD: CPT | Mod: CPTII,,, | Performed by: NURSE PRACTITIONER

## 2024-02-07 PROCEDURE — 81002 URINALYSIS NONAUTO W/O SCOPE: CPT | Mod: ,,, | Performed by: NURSE PRACTITIONER

## 2024-02-07 PROCEDURE — 3078F DIAST BP <80 MM HG: CPT | Mod: CPTII,,, | Performed by: NURSE PRACTITIONER

## 2024-02-07 PROCEDURE — 1160F RVW MEDS BY RX/DR IN RCRD: CPT | Mod: CPTII,,, | Performed by: NURSE PRACTITIONER

## 2024-02-07 PROCEDURE — 99214 OFFICE O/P EST MOD 30 MIN: CPT | Mod: 25,,, | Performed by: NURSE PRACTITIONER

## 2024-02-07 PROCEDURE — 1159F MED LIST DOCD IN RCRD: CPT | Mod: CPTII,,, | Performed by: NURSE PRACTITIONER

## 2024-02-07 PROCEDURE — 96372 THER/PROPH/DIAG INJ SC/IM: CPT | Mod: ,,, | Performed by: NURSE PRACTITIONER

## 2024-02-07 RX ORDER — FLUCONAZOLE 150 MG/1
150 TABLET ORAL DAILY
Qty: 1 TABLET | Refills: 0 | Status: SHIPPED | OUTPATIENT
Start: 2024-02-07 | End: 2024-02-08

## 2024-02-07 RX ORDER — KETOROLAC TROMETHAMINE 30 MG/ML
60 INJECTION, SOLUTION INTRAMUSCULAR; INTRAVENOUS
Status: COMPLETED | OUTPATIENT
Start: 2024-02-07 | End: 2024-02-07

## 2024-02-07 RX ORDER — METHENAMINE, SODIUM PHOSPHATE MONOBASIC, PHENYL SALICYLATE, METHYLENE BLUE, HYOSCYAMINE SULFATE 81.6; 40.8; 36.2; 10.8; .12 MG/1; MG/1; MG/1; MG/1; MG/1
1 TABLET ORAL 4 TIMES DAILY PRN
Qty: 24 TABLET | Refills: 1 | Status: SHIPPED | OUTPATIENT
Start: 2024-02-07 | End: 2024-05-06

## 2024-02-07 RX ADMIN — KETOROLAC TROMETHAMINE 60 MG: 30 INJECTION, SOLUTION INTRAMUSCULAR; INTRAVENOUS at 10:02

## 2024-02-07 NOTE — PROGRESS NOTES
"Subjective:       Patient ID: Barb Telles is a 43 y.o. female.    Chief Complaint: possible uti (Urgency, frequent urination. Just finished bactrim for ingrown hair to breast. )    Patient was taking bactrim for past 8 days but last three days has been left breast folliculitis. Felt discomfort with urination and urgency, but now experiencing increased suprapubic constant pressure and pain. Completed menses yesterday.       Review of Systems   Constitutional:  Negative for activity change, appetite change and fever.   HENT:  Negative for nasal congestion, trouble swallowing and voice change.    Eyes: Negative.  Negative for visual disturbance.   Respiratory: Negative.  Negative for chest tightness, shortness of breath and wheezing.    Cardiovascular:  Negative for chest pain, palpitations and leg swelling.   Gastrointestinal:  Positive for abdominal pain.   Endocrine: Negative for cold intolerance, heat intolerance and polyuria.   Genitourinary:  Positive for pelvic pain. Negative for bladder incontinence, difficulty urinating, flank pain, frequency, vaginal bleeding and vaginal discharge.   Allergic/Immunologic: Negative for environmental allergies and food allergies.   Neurological:  Negative for vertigo, tremors, seizures, syncope, light-headedness, headaches and coordination difficulties.   Hematological:  Negative for adenopathy. Does not bruise/bleed easily.   Psychiatric/Behavioral:  Negative for agitation, sleep disturbance and suicidal ideas.          Objective:      Vitals:    02/07/24 0956   BP: 104/71   Pulse: 88   Resp: 20   Temp: 98.3 °F (36.8 °C)   SpO2: 98%   Weight: 49 kg (108 lb)   Height: 5' 2" (1.575 m)       Physical Exam  Vitals and nursing note reviewed.   Constitutional:       General: She is not in acute distress.     Appearance: She is not ill-appearing.   HENT:      Head: Normocephalic and atraumatic.      Mouth/Throat:      Mouth: Mucous membranes are moist.      Pharynx: " Oropharynx is clear.   Eyes:      General: No scleral icterus.     Extraocular Movements: Extraocular movements intact.      Conjunctiva/sclera: Conjunctivae normal.      Pupils: Pupils are equal, round, and reactive to light.   Neck:      Vascular: No carotid bruit.   Cardiovascular:      Rate and Rhythm: Normal rate and regular rhythm.      Pulses: Normal pulses.      Heart sounds: Normal heart sounds. No murmur heard.     No friction rub. No gallop.   Pulmonary:      Effort: Pulmonary effort is normal. No respiratory distress.      Breath sounds: Normal breath sounds. No wheezing, rhonchi or rales.   Abdominal:      General: Abdomen is flat. Bowel sounds are normal. There is no distension.      Palpations: Abdomen is soft. There is no mass.      Tenderness: There is abdominal tenderness in the suprapubic area.   Musculoskeletal:         General: Normal range of motion.      Cervical back: Normal range of motion and neck supple.   Skin:     General: Skin is warm and dry.   Neurological:      General: No focal deficit present.      Mental Status: She is alert and oriented to person, place, and time.   Psychiatric:         Mood and Affect: Mood normal.         Assessment/Plan:     1. Recurrent UTI (urinary tract infection)  Assessment & Plan:  Was taking bactrim but within last 3 days. Now constant suprapubic pain and pressure. Emptying bladder well. Has seen Dr Cooper, urologist and will await cultures before starting antibiotic. Urimar T. Toradol for pain and treat yeast infection.     Orders:  -     POCT urine dipstick without microscope  -     Urine culture    2. Urinary tract infection with hematuria, site unspecified  -     POCT urine dipstick without microscope  -     Urine culture    3. Vagina, candidiasis  Assessment & Plan:  Diflucan 150 mg today and repeat in 3 days. Send urine for yeast infection.     Orders:  -     POCT urine dipstick without microscope  -     fluconazole (DIFLUCAN) 150 MG Tab; Take 1  tablet (150 mg total) by mouth once daily. for 1 day  Dispense: 1 tablet; Refill: 0    4. UTI symptoms    Other orders  -     methen-m.blue-s.phos-phsal-hyo (UTIRA-C) 81.6-10.8-40.8 mg Tab; Take 1 each by mouth 4 (four) times daily as needed (spasm, dysuria).  Dispense: 24 tablet; Refill: 1       Follow up in about 4 weeks (around 3/6/2024).          Discussed the diagnosis, prognosis, plan of care, chronic nature of and indications for, risks and benefits of treatment for conditions.  Continue all medications as prescribed unless otherwise noted.   Call if patient develops other symptoms or if not better in 2-3 days and sooner if worse. Emphasized the importance of compliance with all recommendations, including medication use and timely follow up. Instructed to return as noted be or sooner if patient develops any other problems or if there are any other additional questions or concerns.

## 2024-02-07 NOTE — ASSESSMENT & PLAN NOTE
Was taking bactrim but within last 3 days. Now constant suprapubic pain and pressure. Emptying bladder well. Has seen Dr Cooper, urologist and will await cultures before starting antibiotic. Marjan MARTINEZ Toradol for pain and treat yeast infection.

## 2024-02-09 LAB
BACTERIA UR CULT: NORMAL
BACTERIA UR CULT: NORMAL

## 2024-02-14 ENCOUNTER — TELEPHONE (OUTPATIENT)
Dept: FAMILY MEDICINE | Facility: CLINIC | Age: 44
End: 2024-02-14
Payer: COMMERCIAL

## 2024-02-14 NOTE — TELEPHONE ENCOUNTER
----- Message from Lucero English DNP sent at 2/12/2024  6:38 AM CST -----  Notify patient no growth in urine eat could be the interstitial cystitis as discussed during visit would need to have follow-up with urologist unless she is feeling better more likely symptoms from irritation and possibly yeast infection but still awaiting urine for yeast growth.  Continue current treatment

## 2024-02-14 NOTE — TELEPHONE ENCOUNTER
Pt notified and states that she started feeling better 2 days after. States that she wants to hold off on urologist.

## 2024-02-17 LAB — YEAST SPEC QL CULT: NORMAL

## 2024-02-19 ENCOUNTER — TELEPHONE (OUTPATIENT)
Dept: FAMILY MEDICINE | Facility: CLINIC | Age: 44
End: 2024-02-19
Payer: COMMERCIAL

## 2024-02-19 NOTE — TELEPHONE ENCOUNTER
----- Message from Lucero English DNP sent at 2/19/2024  6:26 AM CST -----  Notify no culture of urine recommend strongly follow-up with Urology if symptoms persist

## 2024-05-06 ENCOUNTER — OFFICE VISIT (OUTPATIENT)
Dept: FAMILY MEDICINE | Facility: CLINIC | Age: 44
End: 2024-05-06
Payer: COMMERCIAL

## 2024-05-06 VITALS
RESPIRATION RATE: 20 BRPM | TEMPERATURE: 98 F | OXYGEN SATURATION: 100 % | HEART RATE: 90 BPM | SYSTOLIC BLOOD PRESSURE: 122 MMHG | BODY MASS INDEX: 19.14 KG/M2 | HEIGHT: 62 IN | WEIGHT: 104 LBS | DIASTOLIC BLOOD PRESSURE: 68 MMHG

## 2024-05-06 DIAGNOSIS — E78.2 MIXED HYPERLIPIDEMIA: ICD-10-CM

## 2024-05-06 DIAGNOSIS — E55.9 VITAMIN D DEFICIENCY: ICD-10-CM

## 2024-05-06 DIAGNOSIS — F17.210 CIGARETTE NICOTINE DEPENDENCE WITHOUT COMPLICATION: ICD-10-CM

## 2024-05-06 DIAGNOSIS — N39.0 RECURRENT UTI (URINARY TRACT INFECTION): ICD-10-CM

## 2024-05-06 DIAGNOSIS — Z12.31 SCREENING MAMMOGRAM, ENCOUNTER FOR: Primary | ICD-10-CM

## 2024-05-06 LAB
BILIRUB SERPL-MCNC: NEGATIVE MG/DL
BLOOD URINE, POC: NEGATIVE
CLARITY, POC UA: NORMAL
COLOR, POC UA: NORMAL
GLUCOSE UR QL STRIP: NEGATIVE
KETONES UR QL STRIP: NORMAL
LEUKOCYTE ESTERASE URINE, POC: NEGATIVE
NITRITE, POC UA: NEGATIVE
PH, POC UA: 5.5
PROTEIN, POC: NEGATIVE
SPECIFIC GRAVITY, POC UA: >=1.03
UROBILINOGEN, POC UA: NORMAL

## 2024-05-06 PROCEDURE — 3008F BODY MASS INDEX DOCD: CPT | Mod: CPTII,,, | Performed by: NURSE PRACTITIONER

## 2024-05-06 PROCEDURE — 99214 OFFICE O/P EST MOD 30 MIN: CPT | Mod: ,,, | Performed by: NURSE PRACTITIONER

## 2024-05-06 PROCEDURE — 81002 URINALYSIS NONAUTO W/O SCOPE: CPT | Mod: ,,, | Performed by: NURSE PRACTITIONER

## 2024-05-06 PROCEDURE — 1159F MED LIST DOCD IN RCRD: CPT | Mod: CPTII,,, | Performed by: NURSE PRACTITIONER

## 2024-05-06 PROCEDURE — 3078F DIAST BP <80 MM HG: CPT | Mod: CPTII,,, | Performed by: NURSE PRACTITIONER

## 2024-05-06 PROCEDURE — 3074F SYST BP LT 130 MM HG: CPT | Mod: CPTII,,, | Performed by: NURSE PRACTITIONER

## 2024-05-06 PROCEDURE — 1160F RVW MEDS BY RX/DR IN RCRD: CPT | Mod: CPTII,,, | Performed by: NURSE PRACTITIONER

## 2024-05-06 RX ORDER — ROSUVASTATIN CALCIUM 10 MG/1
10 TABLET, COATED ORAL DAILY
Qty: 90 TABLET | Refills: 3 | Status: SHIPPED | OUTPATIENT
Start: 2024-05-06 | End: 2024-05-06

## 2024-05-06 NOTE — ASSESSMENT & PLAN NOTE
Vitamin D 5000 mg daily. Will notify with results of lab draw when available. Continue current treatment until results available.

## 2024-05-06 NOTE — ASSESSMENT & PLAN NOTE
Not taking the Lipitor every night because taking it in the evening is difficult with schedule missing it proximally twice a week and can be more.  Trying to eat healthy not experiencing any side effects with the medications.  We will change to a medication that can be taken in the morning and notify with lab results when available.

## 2024-05-06 NOTE — PROGRESS NOTES
"SUBJECTIVE:  Barb Telles is a 43 y.o. female here alone for breast exam and recheck urine      HPI  Pt in clinic for breast exam and recheck urine. Pt reports that she is doing better. Finished abx. She is also due for mammo gram. Pt is also needing fasting labs. History interstitial cystitis with Dr Junior Cooper.  In the past had told that it was the the lining of the bladder that was irritated and recommended cranberry as long as she drinks this and avoid caffeine she does better.  She does have irritation symptoms most days.    Steffs allergies, medications, history, and problem list were updated as appropriate.    Review of Systems   Respiratory: Negative.     Cardiovascular: Negative.    Gastrointestinal: Negative.    Genitourinary:  Positive for hematuria. Negative for decreased urine volume, difficulty urinating, dyspareunia, dysuria, frequency, menstrual problem, pelvic pain, urgency and vaginal bleeding.   All other systems reviewed and are negative.     A comprehensive review of symptoms was completed and negative except as noted above.    OBJECTIVE:  Vital signs  Vitals:    05/06/24 0831   BP: 122/68   BP Location: Left arm   Patient Position: Sitting   Pulse: 90   Resp: 20   Temp: 97.7 °F (36.5 °C)   SpO2: 100%   Weight: 47.2 kg (104 lb)   Height: 5' 2" (1.575 m)        Physical Exam  Vitals and nursing note reviewed.   Constitutional:       General: She is not in acute distress.     Appearance: She is not ill-appearing.   HENT:      Head: Normocephalic and atraumatic.      Mouth/Throat:      Mouth: Mucous membranes are moist.      Pharynx: Oropharynx is clear.   Eyes:      General: No scleral icterus.     Extraocular Movements: Extraocular movements intact.      Conjunctiva/sclera: Conjunctivae normal.      Pupils: Pupils are equal, round, and reactive to light.        Comments: Raised flesh colored raised papules   Neck:      Vascular: No carotid bruit.   Cardiovascular:      Rate and Rhythm: " Normal rate and regular rhythm.      Pulses: Normal pulses.      Heart sounds: Normal heart sounds. No murmur heard.     No friction rub. No gallop.   Pulmonary:      Effort: Pulmonary effort is normal. No respiratory distress.      Breath sounds: Normal breath sounds. No wheezing, rhonchi or rales.   Abdominal:      General: Abdomen is flat. Bowel sounds are normal. There is no distension.      Palpations: Abdomen is soft. There is no mass.      Tenderness: There is abdominal tenderness in the suprapubic area.   Musculoskeletal:         General: Normal range of motion.      Cervical back: Normal range of motion and neck supple.   Skin:     General: Skin is warm and dry.   Neurological:      General: No focal deficit present.      Mental Status: She is alert and oriented to person, place, and time.   Psychiatric:         Mood and Affect: Mood normal.          No visits with results within 1 Day(s) from this visit.   Latest known visit with results is:   Orders Only on 02/07/2024   Component Date Value Ref Range Status    Yeast Only, Culture 02/07/2024 Comment   Final    No yeast recovered after seven (7) days.          ASSESSMENT/PLAN:    1. Screening mammogram, encounter for  -     Mammo Digital Screening Bilat w/ Davin; Future; Expected date: 05/06/2024    2. Mixed hyperlipidemia  Assessment & Plan:  Not taking the Lipitor every night because taking it in the evening is difficult with schedule missing it proximally twice a week and can be more.  Trying to eat healthy not experiencing any side effects with the medications.  We will change to a medication that can be taken in the morning and notify with lab results when available.    Orders:  -     Discontinue: rosuvastatin (CRESTOR) 10 MG tablet; Take 1 tablet (10 mg total) by mouth once daily.  Dispense: 90 tablet; Refill: 3  -     Comprehensive Metabolic Panel  -     Lipid Panel    3. Vitamin D deficiency  Assessment & Plan:  Vitamin D 5000 mg daily. Will notify  with results of lab draw when available. Continue current treatment until results available.       Orders:  -     Vitamin D    4. Recurrent UTI (urinary tract infection)  Assessment & Plan:  Reports no symptoms unless drinking caffeine. Feeling no symptoms. Will notify with results of lab draw when available. Continue current treatment until results available.         5. Cigarette nicotine dependence without complication  Overview:  Declines smoking cessation program or assistance at this time. It is very important that she quit smoking. There are various alternatives available to help with this difficult task, but first and foremost, she must make a firm commitment and decision to quit. The nature of nicotine addiction is discussed. The usefulness of behavioral therapy is discussed and suggested.  The correct use, cost and side effects of nicotine replacement therapy such as gum or patches is discussed. Bupropion and its cost (sometimes not covered fully by insurance) and side effects are reviewed. The quit rates are discussed. I recommend she not allow potential costs of treatment to deter her from using nicotine replacement therapy or bupropion, as the long term economic and health benefits are obvious.              No results found for this or any previous visit (from the past 24 hour(s)).    Follow Up:  Follow up in about 3 months (around 8/6/2024).      Discussed the diagnosis, prognosis, plan of care, chronic nature of and indications for, risks and benefits of treatment for conditions.  Continue all medications as prescribed unless otherwise noted.   Call if patient develops other symptoms or if not better in 2-3 days and sooner if worse. Emphasized the importance of compliance with all recommendations, including medication use and timely follow up. Instructed to return as noted be or sooner if patient develops any other problems or if there are any other additional questions or concerns.

## 2024-05-06 NOTE — ASSESSMENT & PLAN NOTE
Reports no symptoms unless drinking caffeine. Feeling no symptoms. Will notify with results of lab draw when available. Continue current treatment until results available.

## 2024-05-07 ENCOUNTER — TELEPHONE (OUTPATIENT)
Dept: FAMILY MEDICINE | Facility: CLINIC | Age: 44
End: 2024-05-07
Payer: COMMERCIAL

## 2024-05-07 LAB
25(OH)D3+25(OH)D2 SERPL-MCNC: 27.5 NG/ML (ref 30–100)
ALBUMIN SERPL-MCNC: 4.6 G/DL (ref 3.9–4.9)
ALBUMIN/GLOB SERPL: 2 {RATIO} (ref 1.2–2.2)
ALP SERPL-CCNC: 56 IU/L (ref 44–121)
ALT SERPL-CCNC: 8 IU/L (ref 0–32)
AST SERPL-CCNC: 11 IU/L (ref 0–40)
BILIRUB SERPL-MCNC: 0.5 MG/DL (ref 0–1.2)
BUN SERPL-MCNC: 14 MG/DL (ref 6–24)
BUN/CREAT SERPL: 26 (ref 9–23)
CALCIUM SERPL-MCNC: 9.5 MG/DL (ref 8.7–10.2)
CHLORIDE SERPL-SCNC: 107 MMOL/L (ref 96–106)
CHOLEST SERPL-MCNC: 148 MG/DL (ref 100–199)
CO2 SERPL-SCNC: 19 MMOL/L (ref 20–29)
CREAT SERPL-MCNC: 0.54 MG/DL (ref 0.57–1)
EST. GFR  (NO RACE VARIABLE): 118 ML/MIN/1.73
GLOBULIN SER CALC-MCNC: 2.3 G/DL (ref 1.5–4.5)
GLUCOSE SERPL-MCNC: 89 MG/DL (ref 70–99)
HDLC SERPL-MCNC: 40 MG/DL
LDLC SERPL CALC-MCNC: 93 MG/DL (ref 0–99)
POTASSIUM SERPL-SCNC: 3.9 MMOL/L (ref 3.5–5.2)
PROT SERPL-MCNC: 6.9 G/DL (ref 6–8.5)
SODIUM SERPL-SCNC: 142 MMOL/L (ref 134–144)
TRIGL SERPL-MCNC: 75 MG/DL (ref 0–149)
VLDLC SERPL CALC-MCNC: 15 MG/DL (ref 5–40)

## 2024-05-07 NOTE — TELEPHONE ENCOUNTER
----- Message from Lucero English DNP sent at 5/7/2024  6:44 AM CDT -----  Notify patient vitamin-D too low but better than year ago continue to increase vitamin-D supplementation adding in the additional 1000 IU 6000 daily.  Cholesterol greatly improved from last time recheck CMP FLP vitamin-D with wellness labs in six-month

## 2024-05-13 PROBLEM — N39.0 RECURRENT UTI (URINARY TRACT INFECTION): Status: RESOLVED | Noted: 2024-02-07 | Resolved: 2024-05-13

## 2024-06-21 ENCOUNTER — HOSPITAL ENCOUNTER (OUTPATIENT)
Dept: RADIOLOGY | Facility: HOSPITAL | Age: 44
Discharge: HOME | End: 2024-06-21
Attending: NURSE PRACTITIONER
Payer: COMMERCIAL

## 2024-06-21 DIAGNOSIS — Z12.31 SCREENING MAMMOGRAM, ENCOUNTER FOR: ICD-10-CM

## 2024-06-21 PROCEDURE — 77067 SCR MAMMO BI INCL CAD: CPT | Mod: TC

## 2024-07-03 ENCOUNTER — HOSPITAL ENCOUNTER (OUTPATIENT)
Dept: RADIOLOGY | Facility: HOSPITAL | Age: 44
Discharge: HOME | End: 2024-07-03
Attending: NURSE PRACTITIONER
Payer: COMMERCIAL

## 2024-07-03 DIAGNOSIS — R92.8 ABNORMAL MAMMOGRAM: ICD-10-CM

## 2024-07-03 PROCEDURE — 77061 BREAST TOMOSYNTHESIS UNI: CPT | Mod: 26,RT,, | Performed by: STUDENT IN AN ORGANIZED HEALTH CARE EDUCATION/TRAINING PROGRAM

## 2024-07-03 PROCEDURE — 77061 BREAST TOMOSYNTHESIS UNI: CPT | Mod: TC,RT

## 2024-07-03 PROCEDURE — 76642 ULTRASOUND BREAST LIMITED: CPT | Mod: TC,RT

## 2024-07-03 PROCEDURE — 76642 ULTRASOUND BREAST LIMITED: CPT | Mod: 26,RT,, | Performed by: STUDENT IN AN ORGANIZED HEALTH CARE EDUCATION/TRAINING PROGRAM

## 2024-07-03 PROCEDURE — 77065 DX MAMMO INCL CAD UNI: CPT | Mod: 26,RT,, | Performed by: STUDENT IN AN ORGANIZED HEALTH CARE EDUCATION/TRAINING PROGRAM

## 2024-07-08 ENCOUNTER — TELEPHONE (OUTPATIENT)
Dept: FAMILY MEDICINE | Facility: CLINIC | Age: 44
End: 2024-07-08
Payer: COMMERCIAL

## 2024-07-08 NOTE — TELEPHONE ENCOUNTER
----- Message from Lucero English DNP sent at 7/8/2024  5:55 AM CDT -----  Benign cyst right breast with no evidence malignancy on mammogram, reschedule in one year for breast exam and re screening mammogram unless changes

## 2024-07-22 ENCOUNTER — OFFICE VISIT (OUTPATIENT)
Dept: FAMILY MEDICINE | Facility: CLINIC | Age: 44
End: 2024-07-22
Payer: COMMERCIAL

## 2024-07-22 VITALS
HEIGHT: 62 IN | OXYGEN SATURATION: 100 % | SYSTOLIC BLOOD PRESSURE: 129 MMHG | RESPIRATION RATE: 18 BRPM | DIASTOLIC BLOOD PRESSURE: 79 MMHG | TEMPERATURE: 97 F | WEIGHT: 105 LBS | HEART RATE: 77 BPM | BODY MASS INDEX: 19.32 KG/M2

## 2024-07-22 DIAGNOSIS — N39.0 URINARY TRACT INFECTION WITHOUT HEMATURIA, SITE UNSPECIFIED: Primary | ICD-10-CM

## 2024-07-22 LAB
BILIRUB SERPL-MCNC: NORMAL MG/DL
BLOOD URINE, POC: NEGATIVE
CLARITY, POC UA: NORMAL
COLOR, POC UA: YELLOW
GLUCOSE UR QL STRIP: NEGATIVE
KETONES UR QL STRIP: NEGATIVE
LEUKOCYTE ESTERASE URINE, POC: NORMAL
NITRITE, POC UA: NEGATIVE
PH, POC UA: 6
PROTEIN, POC: NEGATIVE
SPECIFIC GRAVITY, POC UA: >=1.03
UROBILINOGEN, POC UA: 0.2

## 2024-07-22 PROCEDURE — 81002 URINALYSIS NONAUTO W/O SCOPE: CPT | Mod: ,,, | Performed by: NURSE PRACTITIONER

## 2024-07-22 PROCEDURE — 1160F RVW MEDS BY RX/DR IN RCRD: CPT | Mod: CPTII,,, | Performed by: NURSE PRACTITIONER

## 2024-07-22 PROCEDURE — 99214 OFFICE O/P EST MOD 30 MIN: CPT | Mod: ,,, | Performed by: NURSE PRACTITIONER

## 2024-07-22 PROCEDURE — 3078F DIAST BP <80 MM HG: CPT | Mod: CPTII,,, | Performed by: NURSE PRACTITIONER

## 2024-07-22 PROCEDURE — 1159F MED LIST DOCD IN RCRD: CPT | Mod: CPTII,,, | Performed by: NURSE PRACTITIONER

## 2024-07-22 PROCEDURE — 3074F SYST BP LT 130 MM HG: CPT | Mod: CPTII,,, | Performed by: NURSE PRACTITIONER

## 2024-07-22 PROCEDURE — 3008F BODY MASS INDEX DOCD: CPT | Mod: CPTII,,, | Performed by: NURSE PRACTITIONER

## 2024-07-22 RX ORDER — ROSUVASTATIN CALCIUM 10 MG/1
10 TABLET, COATED ORAL NIGHTLY
COMMUNITY
Start: 2024-05-06

## 2024-07-22 RX ORDER — PHENAZOPYRIDINE HYDROCHLORIDE 200 MG/1
200 TABLET, FILM COATED ORAL 3 TIMES DAILY PRN
Qty: 9 TABLET | Refills: 0 | Status: SHIPPED | OUTPATIENT
Start: 2024-07-22 | End: 2024-07-25

## 2024-07-22 RX ORDER — NITROFURANTOIN 25; 75 MG/1; MG/1
100 CAPSULE ORAL 2 TIMES DAILY
Qty: 10 CAPSULE | Refills: 0 | Status: SHIPPED | OUTPATIENT
Start: 2024-07-22 | End: 2024-07-25

## 2024-07-22 NOTE — PROGRESS NOTES
"SUBJECTIVE:  Barb Telles is a 43 y.o. female here alone for UTI.    Urinary Tract Infection   This is a new problem. The current episode started in the past 7 days. The problem occurs every urination. The problem has been gradually worsening. The quality of the pain is described as burning. The pain is at a severity of 0/10. The patient is experiencing no pain. There has been no fever. She is Sexually active. There is No history of pyelonephritis. Associated symptoms include a discharge, frequency and urgency. She has tried nothing for the symptoms. The treatment provided no relief.     Patient presents with symptoms of UTI. Positive for pain with urination, urinary frequency, urinary urgency. Denies fever. Symptoms started Friday.    Steffs allergies, medications, history, and problem list were updated as appropriate.    Review of Systems   Genitourinary:  Positive for frequency and urgency.      A comprehensive review of symptoms was completed and negative except as noted above.    OBJECTIVE:  Vital signs  Vitals:    07/22/24 0944   BP: 129/79   BP Location: Right arm   Patient Position: Sitting   Pulse: 77   Resp: 18   Temp: 96.8 °F (36 °C)   TempSrc: Temporal   SpO2: 100%   Weight: 47.6 kg (105 lb)   Height: 5' 2" (1.575 m)        Physical Exam  Vitals and nursing note reviewed.   Constitutional:       Appearance: Normal appearance.   HENT:      Head: Normocephalic and atraumatic.      Right Ear: Tympanic membrane, ear canal and external ear normal.      Left Ear: Tympanic membrane, ear canal and external ear normal.      Nose: Nose normal.      Mouth/Throat:      Mouth: Mucous membranes are moist.      Pharynx: Oropharynx is clear.   Eyes:      Extraocular Movements: Extraocular movements intact.      Conjunctiva/sclera: Conjunctivae normal.      Pupils: Pupils are equal, round, and reactive to light.   Cardiovascular:      Rate and Rhythm: Normal rate and regular rhythm.      Pulses: Normal pulses.     "  Heart sounds: Normal heart sounds.   Pulmonary:      Effort: Pulmonary effort is normal.      Breath sounds: Normal breath sounds.   Abdominal:      General: Abdomen is flat. Bowel sounds are normal.      Palpations: Abdomen is soft.      Tenderness: There is abdominal tenderness in the suprapubic area.   Musculoskeletal:         General: Normal range of motion.      Cervical back: Normal range of motion.   Skin:     General: Skin is warm and dry.      Capillary Refill: Capillary refill takes less than 2 seconds.   Neurological:      General: No focal deficit present.      Mental Status: She is alert.   Psychiatric:         Mood and Affect: Mood normal.         Behavior: Behavior normal.         Thought Content: Thought content normal.         Judgment: Judgment normal.          Office Visit on 07/22/2024   Component Date Value Ref Range Status    Glucose, UA 07/22/2024 negative   Final    Bilirubin, POC 07/22/2024 small   Final    Ketones, UA 07/22/2024 negative   Final    Spec Grav UA 07/22/2024 >=1.030   Final    Blood, UA 07/22/2024 negative   Final    pH, UA 07/22/2024 6.0   Final    Protein, POC 07/22/2024 negative   Final    Urobilinogen, UA 07/22/2024 0.2   Final    Nitrite, UA 07/22/2024 negative   Final    WBC, UA 07/22/2024 small   Final    Color, UA 07/22/2024 Yellow   Final    Clarity, UA 07/22/2024 Slightly Cloudy   Final          ASSESSMENT/PLAN:    1. Urinary tract infection without hematuria, site unspecified  Assessment & Plan:  Send for culture  Begin macrobid [id number]  Pyridium [id number]     Orders:  -     POCT urine dipstick without microscope  -     nitrofurantoin, macrocrystal-monohydrate, (MACROBID) 100 MG capsule; Take 1 capsule (100 mg total) by mouth 2 (two) times daily. for 5 days  Dispense: 10 capsule; Refill: 0  -     phenazopyridine (PYRIDIUM) 200 MG tablet; Take 1 tablet (200 mg total) by mouth 3 (three) times daily as needed for Pain.  Dispense: 9 tablet; Refill: 0  -     Urine  culture          Recent Results (from the past 24 hour(s))   POCT urine dipstick without microscope    Collection Time: 07/22/24 10:02 AM   Result Value Ref Range    Glucose, UA negative     Bilirubin, POC small     Ketones, UA negative     Spec Grav UA >=1.030     Blood, UA negative     pH, UA 6.0     Protein, POC negative     Urobilinogen, UA 0.2     Nitrite, UA negative     WBC, UA small     Color, UA Yellow     Clarity, UA Slightly Cloudy        Follow Up:  No follow-ups on file.      Discussed the diagnosis, prognosis, plan of care, chronic nature of and indications for, risks and benefits of treatment for conditions.  Continue all medications as prescribed unless otherwise noted.   Call if patient develops other symptoms or if not better in 2-3 days and sooner if worse. Emphasized the importance of compliance with all recommendations, including medication use and timely follow up. Instructed to return as noted be or sooner if patient develops any other problems or if there are any other additional questions or concerns.

## 2024-07-24 LAB
BACTERIA UR CULT: ABNORMAL
BACTERIA UR CULT: ABNORMAL
OTHER ANTIBIOTIC SUSC ISLT: ABNORMAL

## 2024-07-25 ENCOUNTER — TELEPHONE (OUTPATIENT)
Dept: FAMILY MEDICINE | Facility: CLINIC | Age: 44
End: 2024-07-25
Payer: COMMERCIAL

## 2024-07-25 DIAGNOSIS — A49.8 E COLI INFECTION: Primary | ICD-10-CM

## 2024-07-25 RX ORDER — CIPROFLOXACIN 500 MG/1
500 TABLET ORAL EVERY 12 HOURS
Qty: 6 TABLET | Refills: 0 | Status: SHIPPED | OUTPATIENT
Start: 2024-07-25 | End: 2024-07-28

## 2024-07-25 NOTE — TELEPHONE ENCOUNTER
----- Message from JOSH Durant sent at 7/25/2024  6:31 AM CDT -----  Notify culture with e.coli. Is she still having symptoms? Can switch to cipro 500mg bidx3 days. Macrobid not listed on sensitivity

## 2024-07-30 ENCOUNTER — OFFICE VISIT (OUTPATIENT)
Dept: FAMILY MEDICINE | Facility: CLINIC | Age: 44
End: 2024-07-30
Payer: COMMERCIAL

## 2024-07-30 VITALS
TEMPERATURE: 99 F | DIASTOLIC BLOOD PRESSURE: 64 MMHG | BODY MASS INDEX: 19.51 KG/M2 | WEIGHT: 106 LBS | RESPIRATION RATE: 20 BRPM | HEIGHT: 62 IN | SYSTOLIC BLOOD PRESSURE: 110 MMHG | OXYGEN SATURATION: 96 % | HEART RATE: 69 BPM

## 2024-07-30 DIAGNOSIS — R39.9 UTI SYMPTOMS: Primary | ICD-10-CM

## 2024-07-30 DIAGNOSIS — E55.9 VITAMIN D DEFICIENCY: ICD-10-CM

## 2024-07-30 DIAGNOSIS — B37.31 VAGINA, CANDIDIASIS: ICD-10-CM

## 2024-07-30 DIAGNOSIS — N30.00 ACUTE CYSTITIS WITHOUT HEMATURIA: ICD-10-CM

## 2024-07-30 PROBLEM — N39.0 URINARY TRACT INFECTION WITHOUT HEMATURIA: Status: RESOLVED | Noted: 2024-07-22 | Resolved: 2024-07-30

## 2024-07-30 LAB
BILIRUB SERPL-MCNC: NEGATIVE MG/DL
BLOOD URINE, POC: NEGATIVE
CLARITY, POC UA: NORMAL
COLOR, POC UA: NORMAL
GLUCOSE UR QL STRIP: NEGATIVE
KETONES UR QL STRIP: NORMAL
LEUKOCYTE ESTERASE URINE, POC: NORMAL
NITRITE, POC UA: NEGATIVE
PH, POC UA: 7
PROTEIN, POC: NEGATIVE
SPECIFIC GRAVITY, POC UA: >=1.03
UROBILINOGEN, POC UA: NORMAL

## 2024-07-30 PROCEDURE — 81002 URINALYSIS NONAUTO W/O SCOPE: CPT | Mod: ,,, | Performed by: NURSE PRACTITIONER

## 2024-07-30 PROCEDURE — 1160F RVW MEDS BY RX/DR IN RCRD: CPT | Mod: CPTII,,, | Performed by: NURSE PRACTITIONER

## 2024-07-30 PROCEDURE — 1159F MED LIST DOCD IN RCRD: CPT | Mod: CPTII,,, | Performed by: NURSE PRACTITIONER

## 2024-07-30 PROCEDURE — 99213 OFFICE O/P EST LOW 20 MIN: CPT | Mod: ,,, | Performed by: NURSE PRACTITIONER

## 2024-07-30 PROCEDURE — 3078F DIAST BP <80 MM HG: CPT | Mod: CPTII,,, | Performed by: NURSE PRACTITIONER

## 2024-07-30 PROCEDURE — 3008F BODY MASS INDEX DOCD: CPT | Mod: CPTII,,, | Performed by: NURSE PRACTITIONER

## 2024-07-30 PROCEDURE — 3074F SYST BP LT 130 MM HG: CPT | Mod: CPTII,,, | Performed by: NURSE PRACTITIONER

## 2024-07-30 RX ORDER — FLUCONAZOLE 150 MG/1
150 TABLET ORAL DAILY
Qty: 2 TABLET | Refills: 0 | Status: SHIPPED | OUTPATIENT
Start: 2024-07-30

## 2024-07-30 RX ORDER — CIPROFLOXACIN 500 MG/1
500 TABLET ORAL
COMMUNITY
End: 2024-07-30 | Stop reason: ALTCHOICE

## 2024-07-30 NOTE — PROGRESS NOTES
"SUBJECTIVE:  Barb Telles is a 43 y.o. female here for possible uti     HPI  Patient in clinic with uti symptoms. Also complains of yeast infection. Patient was recently seen on 7/22 with uti. Was given macrobid and pyridium. Patient was told to take macrobid for 5 days. Patient also had urine cultured. Patient was called back with results. Was told to stop macrobid and would send out cipro due to macrobid not listed as sensitivity. Seen urologist in past. Was told to take cranberry supplements. Made it worse. So started taking cranberry juice and helped. Patient states that she is still taking it.     Steffs allergies, medications, history, and problem list were updated as appropriate.    Review of Systems   Genitourinary:  Positive for dysuria and vaginal discharge. Negative for decreased urine volume, difficulty urinating, dyspareunia, enuresis, flank pain, frequency, genital sores, hematuria, menstrual problem, pelvic pain, urgency, vaginal bleeding and vaginal pain.   All other systems reviewed and are negative.     A comprehensive review of symptoms was completed and negative except as noted above.    OBJECTIVE:  Vital signs  Vitals:    07/30/24 1050   BP: 110/64   BP Location: Left arm   Patient Position: Sitting   Pulse: 69   Resp: 20   Temp: 98.6 °F (37 °C)   SpO2: 96%   Weight: 48.1 kg (106 lb)   Height: 5' 2" (1.575 m)        Physical Exam  Constitutional:       Appearance: Normal appearance.   HENT:      Head: Normocephalic.      Nose: Nose normal.      Mouth/Throat:      Mouth: Mucous membranes are dry.   Eyes:      Extraocular Movements: Extraocular movements intact.      Conjunctiva/sclera: Conjunctivae normal.   Cardiovascular:      Rate and Rhythm: Normal rate and regular rhythm.      Pulses: Normal pulses.      Heart sounds: Normal heart sounds.   Pulmonary:      Effort: Pulmonary effort is normal.      Breath sounds: Normal breath sounds.   Abdominal:      General: Bowel sounds are normal. " There is no distension.      Palpations: Abdomen is soft.      Tenderness: There is no abdominal tenderness. There is no right CVA tenderness, left CVA tenderness, guarding or rebound.   Musculoskeletal:         General: Normal range of motion.      Cervical back: Normal range of motion.   Skin:     General: Skin is warm and dry.      Capillary Refill: Capillary refill takes less than 2 seconds.   Neurological:      General: No focal deficit present.      Mental Status: She is alert.   Psychiatric:         Mood and Affect: Mood normal.          Office Visit on 07/30/2024   Component Date Value Ref Range Status    Glucose, UA 07/30/2024 negative   Final    Bilirubin, POC 07/30/2024 negative   Final    Ketones, UA 07/30/2024 trace   Final    Spec Grav UA 07/30/2024 >=1.030   Final    Blood, UA 07/30/2024 negative   Final    pH, UA 07/30/2024 7.0   Final    Protein, POC 07/30/2024 negative   Final    Urobilinogen, UA 07/30/2024 0.2 E.U./dl   Final    Nitrite, UA 07/30/2024 negative   Final    WBC, UA 07/30/2024 trace   Final    Color, UA 07/30/2024 Dark Yellow   Final    Clarity, UA 07/30/2024 Slightly Cloudy   Final          ASSESSMENT/PLAN:    1. UTI symptoms  -     POCT urine dipstick without microscope    2. Vagina, candidiasis  Assessment & Plan:  Symptoms began with irritation after after intercourse and took the medication for the infection those symptoms have cleared but now having increased vaginal burning itching vaginal discharge with irritation that is now affecting the bladder is well.  Discussed possible cultures but since it has worked so well in the past would like to continue this she usually resolved completely between symptoms.  Begin Diflucan 150 today and repeat again in 3 days.  If it recurs or becomes more continuous then consider cultures for yeast.       3. Acute cystitis without hematuria  Assessment & Plan:  Symptoms resolved after cipro for 3 days. No longer with symptoms.       4. Vitamin D  deficiency  Assessment & Plan:  Taking vitamin D 5000 weekly, worse after taking daily for some time. Now feeling better with once daily.             Recent Results (from the past 24 hour(s))   POCT urine dipstick without microscope    Collection Time: 07/30/24 10:58 AM   Result Value Ref Range    Glucose, UA negative     Bilirubin, POC negative     Ketones, UA trace     Spec Grav UA >=1.030     Blood, UA negative     pH, UA 7.0     Protein, POC negative     Urobilinogen, UA 0.2 E.U./dl     Nitrite, UA negative     WBC, UA trace     Color, UA Dark Yellow     Clarity, UA Slightly Cloudy        Follow Up:  Follow up if symptoms worsen or fail to improve.      Discussed the diagnosis, prognosis, plan of care, chronic nature of and indications for, risks and benefits of treatment for conditions.  Continue all medications as prescribed unless otherwise noted.   Call if patient develops other symptoms or if not better in 2-3 days and sooner if worse. Emphasized the importance of compliance with all recommendations, including medication use and timely follow up. Instructed to return as noted be or sooner if patient develops any other problems or if there are any other additional questions or concerns.

## 2024-07-30 NOTE — ASSESSMENT & PLAN NOTE
Symptoms began with irritation after after intercourse and took the medication for the infection those symptoms have cleared but now having increased vaginal burning itching vaginal discharge with irritation that is now affecting the bladder is well.  Discussed possible cultures but since it has worked so well in the past would like to continue this she usually resolved completely between symptoms.  Begin Diflucan 150 today and repeat again in 3 days.  If it recurs or becomes more continuous then consider cultures for yeast.

## 2024-07-30 NOTE — ASSESSMENT & PLAN NOTE
Taking vitamin D 5000 weekly, worse after taking daily for some time. Now feeling better with once daily.

## 2024-08-06 ENCOUNTER — OFFICE VISIT (OUTPATIENT)
Dept: FAMILY MEDICINE | Facility: CLINIC | Age: 44
End: 2024-08-06
Payer: COMMERCIAL

## 2024-08-06 VITALS
OXYGEN SATURATION: 99 % | WEIGHT: 107 LBS | BODY MASS INDEX: 19.69 KG/M2 | HEIGHT: 62 IN | TEMPERATURE: 98 F | HEART RATE: 91 BPM | RESPIRATION RATE: 20 BRPM | SYSTOLIC BLOOD PRESSURE: 118 MMHG | DIASTOLIC BLOOD PRESSURE: 68 MMHG

## 2024-08-06 DIAGNOSIS — E78.2 MIXED HYPERLIPIDEMIA: Primary | ICD-10-CM

## 2024-08-06 DIAGNOSIS — E55.9 VITAMIN D DEFICIENCY: ICD-10-CM

## 2024-08-06 PROBLEM — B37.31 VAGINA, CANDIDIASIS: Status: RESOLVED | Noted: 2023-02-13 | Resolved: 2024-08-06

## 2024-08-06 PROCEDURE — 99214 OFFICE O/P EST MOD 30 MIN: CPT | Mod: ,,, | Performed by: NURSE PRACTITIONER

## 2024-08-06 PROCEDURE — 1160F RVW MEDS BY RX/DR IN RCRD: CPT | Mod: CPTII,,, | Performed by: NURSE PRACTITIONER

## 2024-08-06 PROCEDURE — 3008F BODY MASS INDEX DOCD: CPT | Mod: CPTII,,, | Performed by: NURSE PRACTITIONER

## 2024-08-06 PROCEDURE — 3078F DIAST BP <80 MM HG: CPT | Mod: CPTII,,, | Performed by: NURSE PRACTITIONER

## 2024-08-06 PROCEDURE — 1159F MED LIST DOCD IN RCRD: CPT | Mod: CPTII,,, | Performed by: NURSE PRACTITIONER

## 2024-08-06 PROCEDURE — 3074F SYST BP LT 130 MM HG: CPT | Mod: CPTII,,, | Performed by: NURSE PRACTITIONER

## 2024-08-07 LAB
25(OH)D3+25(OH)D2 SERPL-MCNC: 25.2 NG/ML (ref 30–100)
ALBUMIN SERPL-MCNC: 4.8 G/DL (ref 3.9–4.9)
ALP SERPL-CCNC: 63 IU/L (ref 44–121)
ALT SERPL-CCNC: 18 IU/L (ref 0–32)
AST SERPL-CCNC: 18 IU/L (ref 0–40)
BILIRUB SERPL-MCNC: 0.2 MG/DL (ref 0–1.2)
BUN SERPL-MCNC: 11 MG/DL (ref 6–24)
BUN/CREAT SERPL: 17 (ref 9–23)
CALCIUM SERPL-MCNC: 9.5 MG/DL (ref 8.7–10.2)
CHLORIDE SERPL-SCNC: 105 MMOL/L (ref 96–106)
CHOLEST SERPL-MCNC: 187 MG/DL (ref 100–199)
CO2 SERPL-SCNC: 18 MMOL/L (ref 20–29)
CREAT SERPL-MCNC: 0.65 MG/DL (ref 0.57–1)
EST. GFR  (NO RACE VARIABLE): 113 ML/MIN/1.73
GLOBULIN SER CALC-MCNC: 2 G/DL (ref 1.5–4.5)
GLUCOSE SERPL-MCNC: 90 MG/DL (ref 70–99)
HDLC SERPL-MCNC: 46 MG/DL
LDLC SERPL CALC-MCNC: 120 MG/DL (ref 0–99)
POTASSIUM SERPL-SCNC: 4.1 MMOL/L (ref 3.5–5.2)
PROT SERPL-MCNC: 6.8 G/DL (ref 6–8.5)
SODIUM SERPL-SCNC: 144 MMOL/L (ref 134–144)
TRIGL SERPL-MCNC: 114 MG/DL (ref 0–149)
VLDLC SERPL CALC-MCNC: 21 MG/DL (ref 5–40)

## 2024-08-08 ENCOUNTER — TELEPHONE (OUTPATIENT)
Dept: FAMILY MEDICINE | Facility: CLINIC | Age: 44
End: 2024-08-08
Payer: COMMERCIAL

## 2024-08-13 ENCOUNTER — OFFICE VISIT (OUTPATIENT)
Dept: OBSTETRICS AND GYNECOLOGY | Facility: CLINIC | Age: 44
End: 2024-08-13
Payer: COMMERCIAL

## 2024-08-13 VITALS
BODY MASS INDEX: 19.25 KG/M2 | WEIGHT: 104.63 LBS | HEIGHT: 62 IN | SYSTOLIC BLOOD PRESSURE: 126 MMHG | DIASTOLIC BLOOD PRESSURE: 80 MMHG

## 2024-08-13 DIAGNOSIS — N30.01 ACUTE CYSTITIS WITH HEMATURIA: ICD-10-CM

## 2024-08-13 DIAGNOSIS — N94.9 VAGINAL BURNING: Primary | ICD-10-CM

## 2024-08-13 DIAGNOSIS — R30.0 DYSURIA: ICD-10-CM

## 2024-08-13 LAB
BACTERIA HYPHAE, POC: NEGATIVE
BILIRUB UR QL STRIP: NEGATIVE
GARDNERELLA VAGINALIS: NEGATIVE
GLUCOSE UR QL STRIP: NEGATIVE
KETONES UR QL STRIP: NEGATIVE
LEUKOCYTE ESTERASE UR QL STRIP: POSITIVE
OTHER MICROSC. OBSERVATIONS: NORMAL
PH, POC UA: 6.5
POC BACTERIAL VAGINOSIS: NEGATIVE
POC BLOOD, URINE: NEGATIVE
POC CLUE CELLS: NEGATIVE
POC NITRATES, URINE: NEGATIVE
PROT UR QL STRIP: POSITIVE
SP GR UR STRIP: 1.02 (ref 1–1.03)
TRICHOMONAS, POC: NEGATIVE
UROBILINOGEN UR STRIP-ACNC: 0.2 (ref 0.1–1.1)
YEAST WET PREP: NEGATIVE
YEAST, POC: NEGATIVE

## 2024-08-13 PROCEDURE — 81003 URINALYSIS AUTO W/O SCOPE: CPT | Mod: QW,,,

## 2024-08-13 PROCEDURE — 87086 URINE CULTURE/COLONY COUNT: CPT

## 2024-08-13 PROCEDURE — 3008F BODY MASS INDEX DOCD: CPT | Mod: CPTII,,,

## 2024-08-13 PROCEDURE — 1159F MED LIST DOCD IN RCRD: CPT | Mod: CPTII,,,

## 2024-08-13 PROCEDURE — 87210 SMEAR WET MOUNT SALINE/INK: CPT | Mod: QW,,,

## 2024-08-13 PROCEDURE — 3079F DIAST BP 80-89 MM HG: CPT | Mod: CPTII,,,

## 2024-08-13 PROCEDURE — 3074F SYST BP LT 130 MM HG: CPT | Mod: CPTII,,,

## 2024-08-13 PROCEDURE — 1160F RVW MEDS BY RX/DR IN RCRD: CPT | Mod: CPTII,,,

## 2024-08-13 PROCEDURE — 99203 OFFICE O/P NEW LOW 30 MIN: CPT | Mod: ,,,

## 2024-08-13 PROCEDURE — 87220 TISSUE EXAM FOR FUNGI: CPT | Mod: QW,,,

## 2024-08-13 RX ORDER — LEVOFLOXACIN 750 MG/1
750 TABLET ORAL DAILY
Qty: 5 TABLET | Refills: 0 | Status: SHIPPED | OUTPATIENT
Start: 2024-08-13 | End: 2024-08-18

## 2024-08-13 RX ORDER — CLOTRIMAZOLE AND BETAMETHASONE DIPROPIONATE 10; .64 MG/G; MG/G
CREAM TOPICAL 2 TIMES DAILY
Qty: 45 G | Refills: 1 | Status: SHIPPED | OUTPATIENT
Start: 2024-08-13

## 2024-08-13 NOTE — PROGRESS NOTES
"Chief Complaint:  Gynecologic Exam (New patient here to re-establish care. C/o "Recently treated for UTI and yeast infection. Currently still symptomatic. Pain, burning, itching, and muscle spasms.")    History of Present Illness:  Barb Telles is a 43 y.o.  who presents to clinic to reestablish care.  Patient states she has been experiencing recurrent UTI and yeast infections over the past few weeks.  States she was treated by her primary care with both Macrobid and Cipro and states symptoms will relief for a short time and then return.  Patient describes as burning sensation around urethra with associated bladder spasms and bladder pain.  Patient did recently start probiotics last week.    Gyn History:  Menstrual History  Cycle: Yes (2024)  Menarche Age: 19 years  Flow Duration: 4  Flow: Normal  Interval: 28  Intermenstrual Bleeding: No  Dysmenorrhea: Yes ("Cramps start about 1 week after my cycle and last for about 3 weeks until my cycle starts." 24)  Dysmenorrhea Severity : Moderate    Menopause  Menopause Age: 1 years    Pap History  Last pap date: 21  Result: Normal (WNL)  History of Abnormal Pap: No  HPV Vaccine Completed: No (0/3)    Baird  Sexually Active: Yes  Sexual Orientation: heterosexual  Postcoital Bleeding: No  Dyspareunia: No  STI History: No  Contraception: Yes  Contraception Type: Condoms    Breast History  Last Breast Imaging Date: Yes  Date: 24  History of Abnormal Breast Imaging : (!) Yes (2024 Benign)  Date: 24  History of Breast Biopsy: No        Review of Systems:  General/Constitutional: Chills denies. Fatigue/weakness denies. Fever denies. Night sweats denies. Hot flashes denies  Gastrointestinal: Abdominal pain denies. Blood in stool denies. Constipation denies. Diarrhea denies. Heartburn denies. Nausea denies. Vomiting denies   Genitourinary: Incontinence denies. Blood in urine denies. Frequent urination denies. Urgency denies. " "Painful urination admits. Nocturia denies   Gynecologic: Irregular menses denies. Heavy bleeding denies. Painful menses denies. Vaginal discharge denies. Vaginal odor denies. Vaginal itching/Irritation admits Vaginal lesion denies.  Pelvic pain denies. Decreased libido denies. Vulvar lesion denies. Prolapse of genital organs denies. Painful intercourse denies. Postcoital bleeding denies     OB History    Para Term  AB Living   0 0 0 0 0 0   SAB IAB Ectopic Multiple Live Births   0 0 0 0 0      The patient has never been pregnant.    Past Medical History:   Diagnosis Date    Dysfunction of eustachian tube     Eczema of external auditory canal     Mixed hyperlipidemia     Other seasonal allergic rhinitis     Skin lesion        Current Outpatient Medications:     cholecalciferol, vitamin D3, 125 mcg (5,000 unit) capsule, Take 1 capsule (5,000 Units total) by mouth once daily., Disp: 90 capsule, Rfl: 0    Lactobacillus rhamnosus GG (CULTURELLE) 10 billion cell capsule, Take 1 capsule by mouth once daily., Disp: , Rfl:     rosuvastatin (CRESTOR) 10 MG tablet, Take 10 mg by mouth every evening., Disp: , Rfl:     clotrimazole-betamethasone 1-0.05% (LOTRISONE) cream, Apply topically 2 (two) times daily., Disp: 45 g, Rfl: 1    levoFLOXacin (LEVAQUIN) 750 MG tablet, Take 1 tablet (750 mg total) by mouth once daily. for 5 days, Disp: 5 tablet, Rfl: 0      Physical Exam:  /80 (BP Location: Left arm, Patient Position: Sitting)   Ht 5' 2" (1.575 m)   Wt 47.4 kg (104 lb 9.6 oz)   LMP 2024 (Exact Date)   BMI 19.13 kg/m²     Chaperone present.       Constitutional: General appearance: healthy, well-nourished and well-developed   Psychiatric: Orientation to time, place and person. Normal mood and affect and active, alert   Abdomen: Auscultation/Inspection/Palpation: No tenderness or masses. Soft, nondistended    Female Genitalia:      Vulva: no masses, atrophy or lesions      Bladder/Urethra: no urethral " discharge or mass, normal meatus, bladder non-distended. Erythema noted to labia minora      Vagina: no tenderness, erythema, cystocele, rectocele, abnormal vaginal discharge, or vesicle(s) or ulcers                  Cervix: no discharge or cervical motion tenderness and grossly normal        Wet prep: WNL    Assessment/Plan:  1. Vaginal burning  -     clotrimazole-betamethasone 1-0.05% (LOTRISONE) cream; Apply topically 2 (two) times daily.  Dispense: 45 g; Refill: 1  -     MDL Sendout Test: Leuk, myco, urea, AV, BV, CV panels  -     POCT Wet Prep  -     POCT KOH  -Continue probiotics    2. Acute cystitis with hematuria  -     levoFLOXacin (LEVAQUIN) 750 MG tablet; Take 1 tablet (750 mg total) by mouth once daily. for 5 days  Dispense: 5 tablet; Refill: 0    3. Dysuria  -     POCT Urinalysis, Dipstick, Automated, W/O Scope  -     Urine Culture High Risk    RTC in 2 weeks for results review.

## 2024-08-17 LAB — BACTERIA UR CULT: NORMAL

## 2024-08-19 ENCOUNTER — PATIENT MESSAGE (OUTPATIENT)
Dept: OBSTETRICS AND GYNECOLOGY | Facility: CLINIC | Age: 44
End: 2024-08-19
Payer: COMMERCIAL

## 2024-08-19 ENCOUNTER — TELEPHONE (OUTPATIENT)
Dept: OBSTETRICS AND GYNECOLOGY | Facility: CLINIC | Age: 44
End: 2024-08-19
Payer: COMMERCIAL

## 2024-08-19 ENCOUNTER — TELEPHONE (OUTPATIENT)
Dept: FAMILY MEDICINE | Facility: CLINIC | Age: 44
End: 2024-08-19
Payer: COMMERCIAL

## 2024-08-19 DIAGNOSIS — A49.8 ENTEROCOCCUS FAECALIS INFECTION: Primary | ICD-10-CM

## 2024-08-19 NOTE — TELEPHONE ENCOUNTER
----- Message from KENNETH Bosch sent at 8/19/2024  4:34 PM CDT -----  Please notify pt of +enterococcus on one swab. She will need to be treated with Ampicillin 500mg QID x7 days. Thanks!

## 2024-08-19 NOTE — TELEPHONE ENCOUNTER
Attempted to contact pt no answer voicemail was left for pt to call office and also pt portal was sent. Pt oneswabs +enterococcus    Ampicillin 500mg QID x7 days

## 2024-08-20 RX ORDER — AMPICILLIN 500 MG/1
500 CAPSULE ORAL 4 TIMES DAILY
Qty: 28 CAPSULE | Refills: 0 | Status: SHIPPED | OUTPATIENT
Start: 2024-08-20 | End: 2024-08-27

## 2024-08-20 NOTE — TELEPHONE ENCOUNTER
----- Message from Jazmine Levi sent at 8/20/2024  8:10 AM CDT -----  Regarding: Returned Call  Patient returned missed call from 8/19/24 in regards to results

## 2024-08-20 NOTE — TELEPHONE ENCOUNTER
Spoke with pt and informed of oneswab results and per Nikki Ampicillin to be sent to pharmacy pt have an allergy to Amoxicillin-pot Clavulanate but reaction says shoulder pain and per Nikki Ampicillin is ok top be sent

## 2024-08-28 ENCOUNTER — TELEPHONE (OUTPATIENT)
Dept: OBSTETRICS AND GYNECOLOGY | Facility: CLINIC | Age: 44
End: 2024-08-28
Payer: COMMERCIAL

## 2024-08-28 DIAGNOSIS — B96.89 BV (BACTERIAL VAGINOSIS): Primary | ICD-10-CM

## 2024-08-28 DIAGNOSIS — N76.0 BV (BACTERIAL VAGINOSIS): Primary | ICD-10-CM

## 2024-08-28 NOTE — TELEPHONE ENCOUNTER
----- Message from Karen Nieto sent at 8/28/2024  1:25 PM CDT -----  Regarding: Med request    Who Called:  patient  Symptoms (please be specific):  meds sent out at [facility] on 8/13 not working; needs something different- maybe stronger sent to [address] in [place]   Best Call Back Number:  [phone]

## 2024-08-28 NOTE — TELEPHONE ENCOUNTER
Patient c/o still having burning to vulva/vagina.  Has completed Ampicillin and Lotrisone as directed.  Lotrisone did give some relief, but did not cure problem.  Ampicillin was completed and no improvement in symptoms were felt.  Spoke w/ KENNETH Ferreira , orders to use lotrisone tonight for relief and she will speak with Dr. Lloyd regarding her situation. We will call her back once Dr. Lloyd  is consulted.  Patient voiced understanding and agrees with plan of care.

## 2024-08-29 RX ORDER — METRONIDAZOLE 500 MG/1
500 TABLET ORAL 2 TIMES DAILY
Qty: 14 TABLET | Refills: 0 | Status: SHIPPED | OUTPATIENT
Start: 2024-08-29 | End: 2024-09-05

## 2024-08-29 RX ORDER — FLUCONAZOLE 200 MG/1
200 TABLET ORAL EVERY OTHER DAY
Qty: 7 TABLET | Refills: 0 | Status: SHIPPED | OUTPATIENT
Start: 2024-08-29 | End: 2024-09-11

## 2024-08-29 NOTE — TELEPHONE ENCOUNTER
Spoke to Dr Barrios,   Please ensure pt was treated for BV with flagyl, complete rx for UTI, call out Diflucan 200mg q other day x7 doses.  to RTC for urine culture MARYLU and exam in unrelieved following treatment.

## 2024-08-29 NOTE — TELEPHONE ENCOUNTER
Contacted patient. Pt completed all antibiotics she was given. Pt was not treated for the BV. Sent out Flagyl 500 mg BID x7 days and Diflucan 200 mg qod x7 doses. Advised pt to call if symptoms do not improve, will bring in for evaluation. Pt voiced understanding.

## 2024-09-17 ENCOUNTER — OFFICE VISIT (OUTPATIENT)
Dept: OBSTETRICS AND GYNECOLOGY | Facility: CLINIC | Age: 44
End: 2024-09-17
Payer: COMMERCIAL

## 2024-09-17 VITALS
DIASTOLIC BLOOD PRESSURE: 70 MMHG | HEIGHT: 62 IN | WEIGHT: 108 LBS | BODY MASS INDEX: 19.88 KG/M2 | SYSTOLIC BLOOD PRESSURE: 110 MMHG

## 2024-09-17 DIAGNOSIS — N94.89 VAGINAL BURNING: Primary | ICD-10-CM

## 2024-09-17 LAB
BILIRUB UR QL STRIP: NEGATIVE
GLUCOSE UR QL STRIP: NEGATIVE
KETONES UR QL STRIP: NEGATIVE
LEUKOCYTE ESTERASE UR QL STRIP: NEGATIVE
PH, POC UA: 7
POC BLOOD, URINE: NEGATIVE
POC NITRATES, URINE: NEGATIVE
PROT UR QL STRIP: POSITIVE
SP GR UR STRIP: 1.02 (ref 1–1.03)
UROBILINOGEN UR STRIP-ACNC: 0.2 (ref 0.1–1.1)

## 2024-09-17 PROCEDURE — 3074F SYST BP LT 130 MM HG: CPT | Mod: CPTII,,,

## 2024-09-17 PROCEDURE — 1160F RVW MEDS BY RX/DR IN RCRD: CPT | Mod: CPTII,,,

## 2024-09-17 PROCEDURE — 3078F DIAST BP <80 MM HG: CPT | Mod: CPTII,,,

## 2024-09-17 PROCEDURE — 3008F BODY MASS INDEX DOCD: CPT | Mod: CPTII,,,

## 2024-09-17 PROCEDURE — 87086 URINE CULTURE/COLONY COUNT: CPT

## 2024-09-17 PROCEDURE — 81003 URINALYSIS AUTO W/O SCOPE: CPT | Mod: QW,,,

## 2024-09-17 PROCEDURE — 99213 OFFICE O/P EST LOW 20 MIN: CPT | Mod: ,,,

## 2024-09-17 PROCEDURE — 1159F MED LIST DOCD IN RCRD: CPT | Mod: CPTII,,,

## 2024-09-18 DIAGNOSIS — N39.0 URINARY TRACT INFECTION WITHOUT HEMATURIA, SITE UNSPECIFIED: Primary | ICD-10-CM

## 2024-09-18 DIAGNOSIS — N39.0 RECURRENT UTI (URINARY TRACT INFECTION): ICD-10-CM

## 2024-09-19 ENCOUNTER — OFFICE VISIT (OUTPATIENT)
Dept: UROLOGY | Facility: CLINIC | Age: 44
End: 2024-09-19
Payer: COMMERCIAL

## 2024-09-19 VITALS
BODY MASS INDEX: 19.88 KG/M2 | HEIGHT: 62 IN | DIASTOLIC BLOOD PRESSURE: 70 MMHG | WEIGHT: 108 LBS | SYSTOLIC BLOOD PRESSURE: 110 MMHG

## 2024-09-19 DIAGNOSIS — N32.81 OVERACTIVE BLADDER: ICD-10-CM

## 2024-09-19 DIAGNOSIS — N39.0 RECURRENT UTI (URINARY TRACT INFECTION): ICD-10-CM

## 2024-09-19 DIAGNOSIS — R30.0 DYSURIA: Primary | ICD-10-CM

## 2024-09-19 LAB
BILIRUBIN, UA POC OHS: NEGATIVE
BLOOD, UA POC OHS: NEGATIVE
CLARITY, UA POC OHS: CLEAR
COLOR, UA POC OHS: YELLOW
GLUCOSE, UA POC OHS: NEGATIVE
KETONES, UA POC OHS: NEGATIVE
LEUKOCYTES, UA POC OHS: NEGATIVE
NITRITE, UA POC OHS: NEGATIVE
PH, UA POC OHS: 7.5
PROTEIN, UA POC OHS: NEGATIVE
SPECIFIC GRAVITY, UA POC OHS: 1.01
UROBILINOGEN, UA POC OHS: 0.2

## 2024-09-19 PROCEDURE — 1160F RVW MEDS BY RX/DR IN RCRD: CPT | Mod: CPTII,S$GLB,,

## 2024-09-19 PROCEDURE — 3008F BODY MASS INDEX DOCD: CPT | Mod: CPTII,S$GLB,,

## 2024-09-19 PROCEDURE — 1159F MED LIST DOCD IN RCRD: CPT | Mod: CPTII,S$GLB,,

## 2024-09-19 PROCEDURE — 99204 OFFICE O/P NEW MOD 45 MIN: CPT | Mod: S$GLB,,,

## 2024-09-19 PROCEDURE — 3074F SYST BP LT 130 MM HG: CPT | Mod: CPTII,S$GLB,,

## 2024-09-19 PROCEDURE — 81003 URINALYSIS AUTO W/O SCOPE: CPT | Mod: QW,S$GLB,,

## 2024-09-19 PROCEDURE — 3078F DIAST BP <80 MM HG: CPT | Mod: CPTII,S$GLB,,

## 2024-09-19 RX ORDER — MIRABEGRON 25 MG/1
25 TABLET, FILM COATED, EXTENDED RELEASE ORAL DAILY
Qty: 30 TABLET | Refills: 11 | Status: SHIPPED | OUTPATIENT
Start: 2024-09-19 | End: 2025-09-19

## 2024-09-19 RX ORDER — ESTRADIOL 0.1 MG/G
1 CREAM VAGINAL
Qty: 42.5 G | Refills: 11 | Status: SHIPPED | OUTPATIENT
Start: 2024-09-20 | End: 2025-09-20

## 2024-09-19 NOTE — PROGRESS NOTES
Subjective:       Patient ID: Barb Telles is a 43 y.o. female.    Chief Complaint: No chief complaint on file.      HPI: 43-year-old female new patient referred today for frequent UTI.  Patient reports in July she developed some dysuria and urinary frequency.  She was started on Macrobid but 3 days later after culture results were obtained she was changed to Cipro.    Patient completed antibiotics but developed some irritation to her external vaginal area so she was started on Diflucan for possible yeast infection.    She reports a week and a half later she was still having dysuria and external irritation and tenderness to her urethral area so she was seen by her gynecologist who performed a Pap smear.  Her urinalysis with culture done by her gynecologist was negative for infection however she had bacterial vaginosis and Enterococcus on 1 of her vaginal swab so she was treated with ampicillin, Flagyl, and additional dosages of Diflucan.    Patient reports after being on back-to-back antibiotics she still has seen no improvement in his having persistent dysuria and burning in her vaginal and external urethral area.  She is also complaining of some lower abdominal pressure that was previously intermittent however now it is constant.    She reports that with 1 of the suspected urinary tract infections she was prescribed Uribel which did help temporarily relieve her dysuria.    She also reports that during her gyn exam her women's health practitioner noted the tissue surrounding her urethra was white and recommended she have a urethral biopsy however her and her  refused the biopsy as they wished to be seen by urologists 1st       Past Medical History:   Past Medical History:   Diagnosis Date    Dysfunction of eustachian tube     Eczema of external auditory canal     High cholesterol     Mixed hyperlipidemia     Other seasonal allergic rhinitis     Skin lesion     Urinary tract infection        Past  Surgical Historical: History reviewed. No pertinent surgical history.     Medications:   Medication List with Changes/Refills   New Medications    ESTRADIOL (ESTRACE) 0.01 % (0.1 MG/GRAM) VAGINAL CREAM    Place 1 g vaginally 3 (three) times a week.    METHEN-M.BLUE-S.PHOS-PHSAL-HYO (URIBEL) 118-10-40.8-36 MG CAP    Take 1 capsule by mouth 3 (three) times daily as needed (Dysuria).    MIRABEGRON (MYRBETRIQ) 25 MG TB24 ER TABLET    Take 1 tablet (25 mg total) by mouth once daily.   Current Medications    CHOLECALCIFEROL, VITAMIN D3, 125 MCG (5,000 UNIT) CAPSULE    Take 1 capsule (5,000 Units total) by mouth once daily.    CLOTRIMAZOLE-BETAMETHASONE 1-0.05% (LOTRISONE) CREAM    Apply topically 2 (two) times daily.    LACTOBACILLUS RHAMNOSUS GG (CULTURELLE) 10 BILLION CELL CAPSULE    Take 1 capsule by mouth once daily.    ROSUVASTATIN (CRESTOR) 10 MG TABLET    Take 10 mg by mouth every evening.        Past Social History:   Social History     Socioeconomic History    Marital status:    Tobacco Use    Smoking status: Every Day     Current packs/day: 1.00     Average packs/day: 1 pack/day for 24.7 years (24.7 ttl pk-yrs)     Types: Cigarettes, Vaping with nicotine     Start date: 2000     Passive exposure: Current    Smokeless tobacco: Never   Substance and Sexual Activity    Alcohol use: Not Currently    Drug use: Never    Sexual activity: Yes     Birth control/protection: Condom       Allergies:   Review of patient's allergies indicates:   Allergen Reactions    Amoxicillin-pot clavulanate      Other reaction(s): Shoulder pain        Family History:   Family History   Adopted: Yes   Problem Relation Name Age of Onset    Stroke Mother      Breast cancer Neg Hx      Cervical cancer Neg Hx      Colon cancer Neg Hx      Ovarian cancer Neg Hx      Uterine cancer Neg Hx          Review of Systems:  Review of Systems   Constitutional:  Negative for activity change, appetite change, chills, diaphoresis, fatigue, fever and  unexpected weight change.   HENT:  Negative for congestion, dental problem, drooling, ear discharge, ear pain, facial swelling, hearing loss, mouth sores, nosebleeds, postnasal drip, rhinorrhea, sinus pressure, sinus pain, sneezing, sore throat, tinnitus, trouble swallowing and voice change.    Eyes:  Negative for photophobia, pain, discharge, redness, itching and visual disturbance.   Respiratory:  Negative for apnea, cough, choking, chest tightness, shortness of breath, wheezing and stridor.    Cardiovascular:  Negative for chest pain and leg swelling.   Gastrointestinal:  Negative for abdominal distention, abdominal pain (Pressure), anal bleeding, blood in stool, constipation, diarrhea, nausea, rectal pain and vomiting.   Endocrine: Negative for cold intolerance, heat intolerance, polydipsia, polyphagia and polyuria.   Genitourinary:  Positive for dysuria and vaginal pain. Negative for decreased urine volume, difficulty urinating, dyspareunia, enuresis, flank pain, frequency, genital sores, hematuria, menstrual problem, pelvic pain, urgency, vaginal bleeding and vaginal discharge.   Musculoskeletal:  Negative for arthralgias, back pain, gait problem, joint swelling, myalgias, neck pain and neck stiffness.   Skin:  Negative for color change, pallor, rash and wound.   Allergic/Immunologic: Negative for environmental allergies, food allergies and immunocompromised state.   Neurological:  Negative for dizziness, tremors, seizures, syncope, facial asymmetry, speech difficulty, weakness, light-headedness, numbness and headaches.   Hematological:  Negative for adenopathy. Does not bruise/bleed easily.   Psychiatric/Behavioral:  Negative for agitation, behavioral problems, confusion, decreased concentration, dysphoric mood, hallucinations, self-injury, sleep disturbance and suicidal ideas. The patient is not nervous/anxious and is not hyperactive.      Physical Exam:  Physical Exam  Cardiovascular:      Rate and Rhythm:  Normal rate.   Pulmonary:      Effort: Pulmonary effort is normal.   Abdominal:      General: Abdomen is flat. Bowel sounds are normal.      Palpations: Abdomen is soft.   Genitourinary:     General: Normal vulva.      Exam position: Lithotomy position.      Urethra: Urethral pain present.      Comments: Upon exam her urethra appears normal.  Had Dr. Kumar also examine her urethra and found no abnormalities or suspicious tissue that would warrant a urethral biopsy  Neurological:      Mental Status: She is alert and oriented to person, place, and time.   Urinalysis: Negative  Assessment/Plan:     Dysuria/overactive bladder/frequent UTI:  Patient's urinalysis is negative and no abnormal gross or tissues was noted to the urethral area upon examination.  We will provide patient with some vaginal estrogen cream and instructed patient to apply to her external vaginal tissue to see if this will assist with her irritation.  We will also start patient on Myrbetriq 25 mg daily instructed her that after a week if she sees no improvement in her lower abdominal pressure and spasms to increase to 50 mg.  We will provide patient with some Uribel to help with her discomfort as she reports in the past this helped with her dysuria.  Differential diagnosis included yeast and urethritis however with the multiple rounds of Diflucan and antibiotics this should have effectively treated her urethral pain if urethritis was the cause.  Patient is to follow up with her gyn     Follow up in 1 month  Problem List Items Addressed This Visit    None  Visit Diagnoses       Dysuria    -  Primary    Relevant Orders    POCT Urinalysis(Instrument)    Overactive bladder        Recurrent UTI (urinary tract infection)

## 2024-09-21 LAB — BACTERIA UR CULT: NORMAL

## 2024-09-23 ENCOUNTER — TELEPHONE (OUTPATIENT)
Dept: OBSTETRICS AND GYNECOLOGY | Facility: CLINIC | Age: 44
End: 2024-09-23
Payer: COMMERCIAL

## 2024-09-23 NOTE — TELEPHONE ENCOUNTER
----- Message from Karen Nieto sent at 9/23/2024 10:31 AM CDT -----  Regarding: Test results  .Type:  Test Results    Who Called:  patient  Best Call Back Number:  [phone]  Additional Information:   left a message with answering service regarding results (MDL/Urine culture)

## 2024-09-25 NOTE — TELEPHONE ENCOUNTER
Called and spoke with pt and informed her per Nikki oneswab +Enterococcus and per Nikki  Enterococcus was previously treated with no improvement of symtoms; will wait for f/u with Dr. Lloyd. Pt verbalized understanding

## 2024-09-26 ENCOUNTER — TELEPHONE (OUTPATIENT)
Dept: OBSTETRICS AND GYNECOLOGY | Facility: CLINIC | Age: 44
End: 2024-09-26
Payer: COMMERCIAL

## 2024-09-26 ENCOUNTER — TELEPHONE (OUTPATIENT)
Dept: UROLOGY | Facility: CLINIC | Age: 44
End: 2024-09-26
Payer: COMMERCIAL

## 2024-09-26 NOTE — TELEPHONE ENCOUNTER
Spoke with pt and informed her of this.    ----- Message from Arielle Holley NP sent at 9/26/2024  9:09 AM CDT -----  I reviewed the notes from her gyn and the infection that they are discussing was from a vaginal swab not from her urinary tract.  Her urine culture performed here showed no growth so she does not have bacteria in her urinary tract.     Supplements are bromelain and L arginine daily, aloe vera capsules for discomfort as needed  ----- Message -----  From: Natacha Wade MA  Sent: 9/26/2024   8:58 AM CDT  To: Arielle Holley NP      ----- Message -----  From: Luz Elena Rice  Sent: 9/26/2024   8:50 AM CDT  To: Leydi Guzmán Staff    Type:  Needs Medical Advice    Who Called: pt  Symptoms (please be specific): na   How long has patient had these symptoms:  na  Pharmacy name and phone #:  na  Would the patient rather a call back or a response via [facility]? call  Best Call Back Number: [phone]    Additional Information: letting provider know that gyn called and advised her that she still has a bacterial infection. Would also like a reminder on what the supplements that were suggested as she lost the paper that she had it written on.

## 2024-09-26 NOTE — TELEPHONE ENCOUNTER
----- Message from Jazmine Levi sent at 9/26/2024  8:40 AM CDT -----  Regarding: Patient message  Contact: Patient  Patient has an appointment on Monday 9/30/24 with Dr Lloyd for a urethra biopsy. Patient called stating she went seen her urologist Arielle Holley NP. (Provider note in her chart).  As of now patient refuses biopsy but request to still see Dr Lloyd.  [phone]

## 2024-09-30 ENCOUNTER — PROCEDURE VISIT (OUTPATIENT)
Dept: OBSTETRICS AND GYNECOLOGY | Facility: CLINIC | Age: 44
End: 2024-09-30
Payer: COMMERCIAL

## 2024-09-30 VITALS — WEIGHT: 109 LBS | SYSTOLIC BLOOD PRESSURE: 118 MMHG | BODY MASS INDEX: 19.94 KG/M2 | DIASTOLIC BLOOD PRESSURE: 70 MMHG

## 2024-09-30 DIAGNOSIS — N76.3 SUBACUTE VULVITIS: Primary | ICD-10-CM

## 2024-09-30 PROCEDURE — 99213 OFFICE O/P EST LOW 20 MIN: CPT | Mod: ,,, | Performed by: OBSTETRICS & GYNECOLOGY

## 2024-09-30 RX ORDER — CLOBETASOL PROPIONATE 0.5 MG/G
CREAM TOPICAL 2 TIMES DAILY
Qty: 60 G | Refills: 2 | Status: SHIPPED | OUTPATIENT
Start: 2024-09-30 | End: 2024-10-30

## 2024-09-30 NOTE — PROGRESS NOTES
"  Chief Complaint     Follow-up (F/u for vaginal burning. Does not want biopsy.)    HPI:     Patient is a 43 y.o.  is a patient of KENNETH Ferreira presents today to f/u complaints of burning sensation around urethra, possible vulvar bx. Has been treated with PCP for recurrent UTI, recurrent yeast. Patient was treated by PCP with Macrobid and Cipro for UTI. Seen by  on 24, given Rx Levaquin for UTI, Lotrisone cream for vaginal burning. On 24, pt was given Ampicillin Enterococcus, Flagyl for BV, Diflucan 200mg q other day x4 doses for yeast.     Seen by Urology- Arielle Holley NP for evaluation of dysuria, frequent UTI's on 24.  During that appointment the urethra was noted to be normal in appearance with no need for biopsy.    Today patient says she she was just finishing her menstrual cycle.  She says during the cycle the symptoms were not as severe.  She describes her pain today as a burning sensation that is worse after touch and occurs primarily area primarily around the labia bilaterally.  She was not been able to be sexually active since the onset of symptoms.  She uses dove unscented soap.  She was her hair to avoid hair product contact with the vulva.  She wears loose fitting cotton underwear.    Per  previous note 24:  Barb Telles 43 y.o. White female  presents to clinic for follow up on vaginal burning.  Patient states symptoms have not improved and states they are basically the same with burning right around the urethra.  Patient was treated for BV and Enterococcus on 24.  Urine culture was negative.  Patient has a urology referral this week also. Pt also used Lotrisone with little relief. Pt is very upset and crying as she is miserable with no relief.    PELVIC EXAM:   Small 1cm area, posterior to urethra, that is pale in color with a "bubbly" like appearance and slighty harder in tissue consistency. When palpated, pt states that is the exact area in " which it burns                Previous History of Present Illness (24):  Barb Telles is a 43 y.o.  who presents to clinic to reestablish care.  Patient states she has been experiencing recurrent UTI and yeast infections over the past few weeks.  States she was treated by her primary care with both Macrobid and Cipro and states symptoms will relief for a short time and then return.  Patient describes as burning sensation around urethra with associated bladder spasms and bladder pain.  Patient did recently start probiotics last week.      Past Medical History:   Diagnosis Date    Dysfunction of eustachian tube     Eczema of external auditory canal     High cholesterol     Mixed hyperlipidemia     Other seasonal allergic rhinitis     Skin lesion     Urinary tract infection        History reviewed. No pertinent surgical history.    Family History   Adopted: Yes   Problem Relation Name Age of Onset    Stroke Mother      Breast cancer Neg Hx      Cervical cancer Neg Hx      Colon cancer Neg Hx      Ovarian cancer Neg Hx      Uterine cancer Neg Hx         OB History          0    Para   0    Term   0       0    AB   0    Living   0         SAB   0    IAB   0    Ectopic   0    Multiple   0    Live Births   0                 Current Outpatient Medications on File Prior to Visit   Medication Sig Dispense Refill    cholecalciferol, vitamin D3, 125 mcg (5,000 unit) capsule Take 1 capsule (5,000 Units total) by mouth once daily. 90 capsule 0    estradioL (ESTRACE) 0.01 % (0.1 mg/gram) vaginal cream Place 1 g vaginally 3 (three) times a week. 42.5 g 11    Lactobacillus rhamnosus GG (CULTURELLE) 10 billion cell capsule Take 1 capsule by mouth once daily.      mirabegron (MYRBETRIQ) 25 mg Tb24 ER tablet Take 1 tablet (25 mg total) by mouth once daily. 30 tablet 11    rosuvastatin (CRESTOR) 10 MG tablet Take 10 mg by mouth every evening.      clotrimazole-betamethasone 1-0.05% (LOTRISONE) cream  Apply topically 2 (two) times daily. (Patient not taking: Reported on 9/17/2024) 45 g 1     No current facility-administered medications on file prior to visit.       Review of Systems:       Review of Systems   Constitutional:  Negative for chills and fever.   Gastrointestinal:  Negative for abdominal pain, constipation and diarrhea.   Genitourinary:  Positive for vaginal pain. Negative for bladder incontinence, decreased libido, dysmenorrhea, dyspareunia, dysuria, flank pain, frequency, genital sores, hematuria, hot flashes, menorrhagia, menstrual problem, pelvic pain, urgency, vaginal bleeding, vaginal discharge, urinary incontinence, postcoital bleeding, postmenopausal bleeding, vaginal dryness and vaginal odor.        Physical Exam:    /70   Wt 49.4 kg (109 lb)   LMP 09/24/2024 (Exact Date)   BMI 19.94 kg/m²     Physical Exam   General Exam:    General Appearance: alert, in no acute distress, normal, well nourished.  Psych:  Orientation: time, place, person.  Mood/Affect: Normal   Abdomen:  - Soft. Non-tender. No rebound tenderness or guardingNo masses. Liver normal. Spleen normal. No hernia palpable.  Pelvis:   - Vulva: Normal   - Perianal skin: Normal   - Urethra: Normal meatus. Q-tip: Not performed   - Vagina: NormalVaginal discharge present: . Cystocele: Absent. Rectocele: Absent   - Cervix: Normal. Cervical motion tenderness Absent   - Uterus: Mobile. Non-tender.   - Adnexal: Normal      Assessment:   1. Subacute vulvitis    Other orders  -     clobetasoL (TEMOVATE) 0.05 % cream; Apply topically 2 (two) times daily.  Dispense: 60 g; Refill: 2             Plan:     Reviewed previous documentation from both Nikki and urology.  Exam is normal today.    Patient prefers not to undergo biopsy today.    I recommend extended course of topical steroid to help alleviate inflammation and pain.    We also discussed the potential for trial of boric acid for relief.    Patient would like to try steroid first  Rx:  Clobetasol 0.05% cream topical BID x30 days   To call in 2 week if not beginning to improve    RTC 1 month f/u     This note was transcribed by Lauren Watson. There may be transcription errors as a result, however minimal. Effort has been made to ensure accuracy of transcription, but any obvious errors or omissions should be clarified with the author of the document.       I agree with the above documentation.

## 2024-09-30 NOTE — PROGRESS NOTES
Subjective     Patient ID: Barb Telles is a 43 y.o. female.    Chief Complaint:  Follow-up (F/u UTI and vaginal burning. Pt states she is still having the same symptoms. Urine cx MARYLU collected. UA ran. )      History of Present Illness  Follow-up    Barb Telles 43 y.o. White female  presents to clinic for follow up on vaginal burning.  Patient states symptoms have not improved and states they are basically the same with burning right around the urethra.  Patient was treated for BV and Enterococcus on 24.  Urine culture was negative.  Patient has a urology referral this week also. Pt also used Lotrisone with little relief. Pt is very upset and crying as she is miserable with no relief.      Previous History of Present Illness (24):  Barb Telles is a 43 y.o.  who presents to clinic to reestablish care.  Patient states she has been experiencing recurrent UTI and yeast infections over the past few weeks.  States she was treated by her primary care with both Macrobid and Cipro and states symptoms will relief for a short time and then return.  Patient describes as burning sensation around urethra with associated bladder spasms and bladder pain.  Patient did recently start probiotics last week.      GYN & OB History  Patient's last menstrual period was 2024 (exact date).   Date of Last Pap: 2021    OB History    Para Term  AB Living   0 0 0 0 0 0   SAB IAB Ectopic Multiple Live Births   0 0 0 0 0       Review of Systems  Review of Systems   Constitutional: Negative.    HENT: Negative.     Eyes: Negative.    Respiratory: Negative.     Cardiovascular: Negative.    Gastrointestinal: Negative.    Endocrine: Negative.    Genitourinary: Negative.         Vaginal burning around urethra   Musculoskeletal: Negative.    Integumentary:  Negative.   Neurological: Negative.    Hematological: Negative.    Psychiatric/Behavioral: Negative.     Breast: negative.   "         Objective   /70   Ht 5' 2" (1.575 m)   Wt 49 kg (108 lb)   LMP 08/28/2024 (Exact Date)   BMI 19.75 kg/m²     Physical Exam:   Constitutional: She is oriented to person, place, and time. She appears well-developed and well-nourished.    HENT:   Head: Normocephalic.    Eyes: Pupils are equal, round, and reactive to light. EOM are normal.     Cardiovascular:  Normal rate.             Pulmonary/Chest: Effort normal.        Abdominal: Soft.     Genitourinary:    Inguinal canal, urethra and rectum normal.            Pelvic exam was performed with patient in the lithotomy position.   The external female genitalia was normal.     Labial bartholins normal.Vagina was moist.Normal urethral meatus.   Genitourinary Comments: Small 1cm area, posterior to urethra, that is pale in color with a "bubbly" like appearance and slighty harder in tissue consistency. When palpated, pt states that is the exact area in which it burns             Musculoskeletal: Normal range of motion.       Neurological: She is alert and oriented to person, place, and time.    Skin: Skin is warm and dry.    Psychiatric: She has a normal mood and affect.            Assessment and Plan     1. Vaginal burning           Plan:   We will recent a urine culture just be sure being previous culture was negative.  I feel it best that patient follow up with Dr. Lloyd for a potential vulvar biopsy of area stated in assessment.  Patient is keep urology appointment this week.               "

## 2024-10-02 ENCOUNTER — TELEPHONE (OUTPATIENT)
Dept: OBSTETRICS AND GYNECOLOGY | Facility: CLINIC | Age: 44
End: 2024-10-02
Payer: COMMERCIAL

## 2024-10-02 NOTE — TELEPHONE ENCOUNTER
----- Message from Jazmine sent at 10/2/2024  2:09 PM CDT -----  Regarding: Message  Contact: Patient  clobetasoL (TEMOVATE) 0.05 % cream was not received at the pharmacy.   Patient call back: [phone]      [facility] - [place], [place] - [address]  [address]  [place] [address]  Phone: [phone] Fax: [phone]

## 2024-10-02 NOTE — TELEPHONE ENCOUNTER
Placed call to pharmacy, states does not have Rx. Verbal given for Rx. States will fill now.   Placed call to pt to notify, voiced understanding.

## 2024-11-26 ENCOUNTER — OFFICE VISIT (OUTPATIENT)
Dept: FAMILY MEDICINE | Facility: CLINIC | Age: 44
End: 2024-11-26
Payer: COMMERCIAL

## 2024-11-26 VITALS
BODY MASS INDEX: 20.06 KG/M2 | WEIGHT: 109 LBS | SYSTOLIC BLOOD PRESSURE: 111 MMHG | DIASTOLIC BLOOD PRESSURE: 64 MMHG | RESPIRATION RATE: 20 BRPM | TEMPERATURE: 98 F | HEIGHT: 62 IN | OXYGEN SATURATION: 99 % | HEART RATE: 116 BPM

## 2024-11-26 DIAGNOSIS — B37.31 VAGINA, CANDIDIASIS: Primary | ICD-10-CM

## 2024-11-26 DIAGNOSIS — E78.2 MIXED HYPERLIPIDEMIA: ICD-10-CM

## 2024-11-26 DIAGNOSIS — E55.9 VITAMIN D DEFICIENCY: ICD-10-CM

## 2024-11-26 PROCEDURE — 3074F SYST BP LT 130 MM HG: CPT | Mod: CPTII,,, | Performed by: NURSE PRACTITIONER

## 2024-11-26 PROCEDURE — 1159F MED LIST DOCD IN RCRD: CPT | Mod: CPTII,,, | Performed by: NURSE PRACTITIONER

## 2024-11-26 PROCEDURE — 3078F DIAST BP <80 MM HG: CPT | Mod: CPTII,,, | Performed by: NURSE PRACTITIONER

## 2024-11-26 PROCEDURE — 1160F RVW MEDS BY RX/DR IN RCRD: CPT | Mod: CPTII,,, | Performed by: NURSE PRACTITIONER

## 2024-11-26 PROCEDURE — 99214 OFFICE O/P EST MOD 30 MIN: CPT | Mod: ,,, | Performed by: NURSE PRACTITIONER

## 2024-11-26 PROCEDURE — 3008F BODY MASS INDEX DOCD: CPT | Mod: CPTII,,, | Performed by: NURSE PRACTITIONER

## 2024-11-26 RX ORDER — ROSUVASTATIN CALCIUM 10 MG/1
10 TABLET, COATED ORAL NIGHTLY
Qty: 90 TABLET | Refills: 1 | Status: SHIPPED | OUTPATIENT
Start: 2024-11-26

## 2024-11-26 RX ORDER — VIT C/E/ZN/COPPR/LUTEIN/ZEAXAN 250MG-90MG
5000 CAPSULE ORAL DAILY
Qty: 90 CAPSULE | Refills: 0 | Status: SHIPPED | OUTPATIENT
Start: 2024-11-26

## 2024-11-26 NOTE — ASSESSMENT & PLAN NOTE
Crestor 10 mg working well and not causing GI upset as with higher dose. Plaque to eyelids receeding. Continue current recheck in 6 months.

## 2024-11-26 NOTE — PROGRESS NOTES
Patient ID: Barb Telles  : 1980     Chief Complaint: review labs and Anxiety    Allergies: Patient is allergic to amoxicillin-pot clavulanate.     History of Present Illness:  The patient is a 44 y.o. White female who presents to clinic for evaluation and management with a chief complaint of review labs and Anxiety   HPI   Patient in clinic with follow-up on lab results. Also increase anxiety. Patient states that she would rather discuss symptoms with you.    Past Medical History:  has a past medical history of Dysfunction of eustachian tube, Eczema of external auditory canal, High cholesterol, Mixed hyperlipidemia, Other seasonal allergic rhinitis, Skin lesion, and Urinary tract infection.    Social History:  reports that she has been smoking cigarettes and vaping with nicotine. She started smoking about 24 years ago. She has a 24.9 pack-year smoking history. She has been exposed to tobacco smoke. She has never used smokeless tobacco. She reports that she does not currently use alcohol. She reports that she does not use drugs.    Care Team: Patient Care Team:  Lucero English DNP as PCP - General (Family Medicine)  Nikki Keller WHNP as Nurse Practitioner (Obstetrics and Gynecology)     Current Medications:  Current Outpatient Medications   Medication Instructions    cholecalciferol (vitamin D3) 5,000 Units, Oral, Daily    clobetasoL (TEMOVATE) 0.05 % cream Topical (Top), 2 times daily    mirabegron (MYRBETRIQ) 25 mg, Oral, Daily    rosuvastatin (CRESTOR) 10 mg, Nightly       Review of Systems   Constitutional:  Negative for activity change and unexpected weight change.   HENT:  Negative for hearing loss, rhinorrhea and trouble swallowing.    Eyes:  Negative for discharge and visual disturbance.   Respiratory:  Negative for chest tightness and wheezing.    Cardiovascular:  Positive for palpitations. Negative for chest pain.   Gastrointestinal:  Negative for blood in stool, constipation, diarrhea  "and vomiting.   Endocrine: Negative for polydipsia and polyuria.   Genitourinary:  Negative for difficulty urinating, dysuria, hematuria and menstrual problem.   Musculoskeletal:  Negative for arthralgias, joint swelling and neck pain.   Neurological:  Negative for weakness and headaches.   Psychiatric/Behavioral:  Negative for confusion and dysphoric mood.         Visit Vitals  /64 (BP Location: Left arm, Patient Position: Sitting)   Pulse (!) 116   Temp 98.4 °F (36.9 °C)   Resp 20   Ht 5' 2" (1.575 m)   Wt 49.4 kg (109 lb)   SpO2 99%   BMI 19.94 kg/m²       Physical Exam  Vitals and nursing note reviewed.   Constitutional:       General: She is not in acute distress.     Appearance: She is not ill-appearing.   HENT:      Head: Normocephalic and atraumatic.      Nose: Rhinorrhea present.      Mouth/Throat:      Mouth: Mucous membranes are moist.      Pharynx: Oropharynx is clear.   Eyes:      General: No scleral icterus.     Extraocular Movements: Extraocular movements intact.      Conjunctiva/sclera: Conjunctivae normal.      Pupils: Pupils are equal, round, and reactive to light.   Neck:      Vascular: No carotid bruit.   Cardiovascular:      Rate and Rhythm: Normal rate and regular rhythm.      Pulses: Normal pulses.      Heart sounds: Normal heart sounds. No murmur heard.     No friction rub. No gallop.   Pulmonary:      Effort: Pulmonary effort is normal. No respiratory distress.      Breath sounds: Normal breath sounds. No wheezing, rhonchi or rales.   Abdominal:      General: Abdomen is flat. Bowel sounds are normal. There is no distension.      Palpations: Abdomen is soft. There is no mass.      Tenderness: There is no abdominal tenderness.   Musculoskeletal:         General: Normal range of motion.      Cervical back: Normal range of motion and neck supple.   Skin:     General: Skin is warm and dry.   Neurological:      General: No focal deficit present.      Mental Status: She is alert and oriented " "to person, place, and time.   Psychiatric:         Mood and Affect: Mood normal.          Labs Reviewed:  Chemistry:  Lab Results   Component Value Date     11/11/2024    K 4.5 11/11/2024    BUN 16 11/11/2024    CREATININE 0.61 11/11/2024    EGFRNORACEVR 114 11/11/2024    CALCIUM 9.9 11/11/2024    ALBUMIN 4.7 11/11/2024    AST 15 11/11/2024    ALT 18 11/11/2024    [id number] 38.8 11/11/2024    TSH 3.900 11/11/2024        No results found for: "HGBA1C", "MICROALBCREA"     Hematology:  Lab Results   Component Value Date    WBC 10.5 11/11/2024    RBC 4.77 11/11/2024    HGB 14.2 11/11/2024    HCT 42.5 11/11/2024    MCV 89 11/11/2024    MCH 29.8 11/11/2024    MCHC 33.4 11/11/2024    RDW 13.1 11/11/2024     11/11/2024    MONO 6 11/11/2024    MONO 0.7 11/11/2024    BASO 0.0 11/11/2024    EOSINOPHIL 4 11/11/2024    BASOPHIL 0 11/11/2024       Lipid Panel:  Lab Results   Component Value Date    CHOL 185 11/11/2024    HDL 50 11/11/2024    LDLCALC 115 (H) 11/11/2024    TRIG 110 11/11/2024        Assessment & Plan:  1. Vagina, candidiasis  Assessment & Plan:  Started boric acid suppositories for 2 days and symptoms improved.       2. Mixed hyperlipidemia  Overview:  A since last visit was changed from Lipitor to Crestor 10 mg qhs, healthy diet to help with medication compliance in am. Will notify with results of lab draw when available. Continue current treatment until results available.       Assessment & Plan:  Crestor 10 mg working well and not causing GI upset as with higher dose. Plaque to eyelids receeding. Continue current recheck in 6 months.       3. Vitamin D deficiency  Assessment & Plan:  Currently taking 1000 mg daily tolerating better. Since no side effects with dose taking consistently therapeutic. 38.8. recheck in 6 months.            Future Appointments   Date Time Provider Department Center   2/10/2025  7:30 AM NURSE, [facility] FAMILY MEDICINE [facility]   6/18/2025  8:20 AM Tameka, " LARRY Barker [facility]       Follow up in about 6 months (around 5/26/2025). Call sooner if needed.    Lucero English DNP

## 2024-11-26 NOTE — ASSESSMENT & PLAN NOTE
Currently taking 1000 mg daily tolerating better. Since no side effects with dose taking consistently therapeutic. 38.8. recheck in 6 months.

## 2025-01-29 ENCOUNTER — OFFICE VISIT (OUTPATIENT)
Dept: FAMILY MEDICINE | Facility: CLINIC | Age: 45
End: 2025-01-29
Payer: COMMERCIAL

## 2025-01-29 VITALS
HEIGHT: 62 IN | HEART RATE: 97 BPM | OXYGEN SATURATION: 100 % | WEIGHT: 118 LBS | SYSTOLIC BLOOD PRESSURE: 121 MMHG | RESPIRATION RATE: 20 BRPM | DIASTOLIC BLOOD PRESSURE: 62 MMHG | TEMPERATURE: 100 F | BODY MASS INDEX: 21.71 KG/M2

## 2025-01-29 DIAGNOSIS — L30.9 ECZEMA, UNSPECIFIED TYPE: ICD-10-CM

## 2025-01-29 DIAGNOSIS — B37.31 VAGINA, CANDIDIASIS: Primary | ICD-10-CM

## 2025-01-29 DIAGNOSIS — K59.00 CONSTIPATION, UNSPECIFIED CONSTIPATION TYPE: ICD-10-CM

## 2025-01-29 DIAGNOSIS — B35.3 TINEA PEDIS OF RIGHT FOOT: ICD-10-CM

## 2025-01-29 DIAGNOSIS — N32.81 OVERACTIVE BLADDER: ICD-10-CM

## 2025-01-29 PROCEDURE — 3008F BODY MASS INDEX DOCD: CPT | Mod: CPTII,,, | Performed by: NURSE PRACTITIONER

## 2025-01-29 PROCEDURE — 3078F DIAST BP <80 MM HG: CPT | Mod: CPTII,,, | Performed by: NURSE PRACTITIONER

## 2025-01-29 PROCEDURE — 1159F MED LIST DOCD IN RCRD: CPT | Mod: CPTII,,, | Performed by: NURSE PRACTITIONER

## 2025-01-29 PROCEDURE — 1160F RVW MEDS BY RX/DR IN RCRD: CPT | Mod: CPTII,,, | Performed by: NURSE PRACTITIONER

## 2025-01-29 PROCEDURE — 3074F SYST BP LT 130 MM HG: CPT | Mod: CPTII,,, | Performed by: NURSE PRACTITIONER

## 2025-01-29 PROCEDURE — 99214 OFFICE O/P EST MOD 30 MIN: CPT | Mod: ,,, | Performed by: NURSE PRACTITIONER

## 2025-01-29 RX ORDER — BETAMETHASONE DIPROPIONATE 0.5 MG/G
CREAM TOPICAL 2 TIMES DAILY
Qty: 45 G | Refills: 1 | Status: SHIPPED | OUTPATIENT
Start: 2025-01-29

## 2025-01-29 RX ORDER — FLUCONAZOLE 150 MG/1
150 TABLET ORAL DAILY
Qty: 3 TABLET | Refills: 0 | Status: SHIPPED | OUTPATIENT
Start: 2025-01-29

## 2025-01-29 RX ORDER — MIRABEGRON 25 MG/1
25 TABLET, FILM COATED, EXTENDED RELEASE ORAL DAILY
Qty: 30 TABLET | Refills: 11 | Status: SHIPPED | OUTPATIENT
Start: 2025-01-29 | End: 2026-01-29

## 2025-01-29 RX ORDER — SELENIUM SULFIDE 2.5 MG/100ML
LOTION TOPICAL
Qty: 118 ML | Refills: 1 | Status: SHIPPED | OUTPATIENT
Start: 2025-01-29

## 2025-01-29 RX ORDER — PRENATAL VIT 91/IRON/FOLIC/DHA 28-975-200
COMBINATION PACKAGE (EA) ORAL 2 TIMES DAILY
Qty: 28.4 G | Refills: 0 | Status: SHIPPED | OUTPATIENT
Start: 2025-01-29

## 2025-01-29 NOTE — ASSESSMENT & PLAN NOTE
Boric acid with aloe vera, or Hylafem vaginally. Stop smoking and alcohol, no intake in year. Diflucan 150 mg and repeat in 2 days for total 3. Will notify with once results when available.

## 2025-01-29 NOTE — PROGRESS NOTES
Patient ID: Barb Telles  : 1980     Chief Complaint: Vaginitis    Allergies: Patient is allergic to amoxicillin-pot clavulanate.     History of Present Illness:  The patient is a 44 y.o. White female who presents to clinic for evaluation and management with a chief complaint of Vaginitis   History of Present Illness    CHIEF COMPLAINT:  Patient presents today for vaginal discomfort.    GYNECOLOGIC:  She reports vaginal discomfort, primarily at the vaginal opening, which is noticeable with movement. She denies current sexual intercourse. She reports positive response to Diflucan therapy in the past.    DERMATOLOGIC:  She reports red, raised, and itchy spots on both buttocks since . The spots were initially non-pruritic but have become itchy, especially after showering. She denies any known allergies to elastic or exposure to cats. She notes dry, scaly skin on one toe, which is a new occurrence. She denies associated yeast infection or skin cracking. She has been using powder on the affected toe for dryness management.    MEDICATIONS:  She reports discontinuing bladder medication during December but resumed after .    SOCIAL HISTORY:  She has discontinued coffee consumption and has abstained from alcohol for approximately one year.      ROS:  General: -fever, -chills, -fatigue, -weight gain, -weight loss  Eyes: -vision changes, -redness, -discharge  ENT: -ear pain, -nasal congestion, -sore throat  Cardiovascular: -chest pain, -palpitations, -lower extremity edema  Respiratory: -cough, -shortness of breath  Gastrointestinal: -abdominal pain, -nausea, -vomiting, -diarrhea, -constipation, -blood in stool  Genitourinary: -dysuria, -hematuria, -frequency, -pelvic pain  Musculoskeletal: -joint pain, -muscle pain  Skin: -rash, -lesion, +itching  Neurological: -headache, -dizziness, -numbness, -tingling  Psychiatric: -anxiety, -depression, -sleep difficulty  Female Genitourinary: -vaginal  discharge          Past Medical History:  has a past medical history of Dysfunction of eustachian tube, Eczema of external auditory canal, High cholesterol, Mixed hyperlipidemia, Other seasonal allergic rhinitis, Skin lesion, and Urinary tract infection.    Social History:  reports that she has been smoking cigarettes and vaping with nicotine. She started smoking about 25 years ago. She has a 25.1 pack-year smoking history. She has been exposed to tobacco smoke. She has never used smokeless tobacco. She reports that she does not currently use alcohol. She reports that she does not use drugs.    Care Team: Patient Care Team:  Lucero English DNP as PCP - General (Family Medicine)  Nikki Keller WHNP as Nurse Practitioner (Obstetrics and Gynecology)     Current Medications:  Current Outpatient Medications   Medication Instructions    cholecalciferol (vitamin D3) 5,000 Units, Oral, Daily    clobetasoL (TEMOVATE) 0.05 % cream Topical (Top), 2 times daily    mirabegron (MYRBETRIQ) 25 mg, Oral, Daily    rosuvastatin (CRESTOR) 10 mg, Oral, Nightly       Review of Systems   Constitutional:  Negative for activity change and unexpected weight change.   HENT:  Negative for hearing loss, rhinorrhea and trouble swallowing.    Eyes:  Negative for discharge and visual disturbance.   Respiratory:  Negative for chest tightness and wheezing.    Cardiovascular:  Negative for chest pain and palpitations.   Gastrointestinal:  Positive for constipation (improved). Negative for blood in stool, diarrhea and vomiting.   Endocrine: Negative for polydipsia and polyuria.   Genitourinary:  Positive for vaginal dryness. Negative for difficulty urinating, dysuria, hematuria and menstrual problem.   Musculoskeletal:  Negative for arthralgias, joint swelling and neck pain.   Integumentary:  Positive for rash.   Neurological:  Negative for weakness and headaches.   Hematological: Negative.    Psychiatric/Behavioral:  Negative for confusion and  "dysphoric mood.         Visit Vitals  /62 (BP Location: Left arm, Patient Position: Sitting)   Pulse 97   Temp 99.6 °F (37.6 °C)   Resp 20   Ht 5' 2" (1.575 m)   Wt 53.5 kg (118 lb)   SpO2 100%   BMI 21.58 kg/m²       Physical Exam  Vitals and nursing note reviewed. Exam conducted with a chaperone present.   Constitutional:       Appearance: Normal appearance.   HENT:      Head: Normocephalic.      Nose: Nose normal.      Mouth/Throat:      Mouth: Mucous membranes are dry.   Eyes:      Extraocular Movements: Extraocular movements intact.      Conjunctiva/sclera: Conjunctivae normal.   Cardiovascular:      Rate and Rhythm: Normal rate and regular rhythm.      Pulses: Normal pulses.      Heart sounds: Normal heart sounds.   Pulmonary:      Effort: Pulmonary effort is normal.      Breath sounds: Normal breath sounds.   Abdominal:      General: Bowel sounds are normal.      Palpations: Abdomen is soft.   Genitourinary:     General: Normal vulva.      Labia:         Right: Rash present. No lesion.         Left: Rash present. No tenderness or lesion.       Vagina: Vaginal discharge present. No erythema or tenderness.      Cervix: Discharge and erythema present.       Musculoskeletal:         General: Normal range of motion.      Cervical back: Normal range of motion.   Skin:     General: Skin is warm and dry.      Capillary Refill: Capillary refill takes less than 2 seconds.   Neurological:      General: No focal deficit present.      Mental Status: She is alert.   Psychiatric:         Mood and Affect: Mood normal.          Labs Reviewed:  Chemistry:  Lab Results   Component Value Date     11/11/2024    K 4.5 11/11/2024    BUN 16 11/11/2024    CREATININE 0.61 11/11/2024    EGFRNORACEVR 114 11/11/2024    CALCIUM 9.9 11/11/2024    ALBUMIN 4.7 11/11/2024    AST 15 11/11/2024    ALT 18 11/11/2024    [id number] 38.8 11/11/2024    TSH 3.900 11/11/2024        No results found for: "HGBA1C", "MICROALBCREA" "     Hematology:  Lab Results   Component Value Date    WBC 10.5 11/11/2024    RBC 4.77 11/11/2024    HGB 14.2 11/11/2024    HCT 42.5 11/11/2024    MCV 89 11/11/2024    MCH 29.8 11/11/2024    MCHC 33.4 11/11/2024    RDW 13.1 11/11/2024     11/11/2024    MONO 6 11/11/2024    MONO 0.7 11/11/2024    BASO 0.0 11/11/2024    EOSINOPHIL 4 11/11/2024    BASOPHIL 0 11/11/2024       Lipid Panel:  Lab Results   Component Value Date    CHOL 185 11/11/2024    HDL 50 11/11/2024    LDLCALC 115 (H) 11/11/2024    TRIG 110 11/11/2024        Assessment & Plan:  1. Vagina, candidiasis  Assessment & Plan:  Boric acid with aloe vera, or Hylafem vaginally. Stop smoking and alcohol, no intake in year. Diflucan 150 mg and repeat in 2 days for total 3. Will notify with once results when available.        2. Constipation, unspecified constipation type  Assessment & Plan:  Had stopped taking caffeine and became constipation. Now using coffee again and feeling better.       3. Tinea pedis of right foot  Assessment & Plan:  Between 4th and 5th right toes with erythema. Lamisil bid for 2 weeks.       4. Eczema, unspecified type  Assessment & Plan:  Apply bid to affected area. Apply lotion. Notify any continued problems.            Assessment & Plan    B37.31 Vagina, candidiasis  K59.00 Constipation, unspecified constipation type  IMPRESSION:  - Assessed vaginal symptoms as likely due to pH imbalance and dryness rather than classic yeast or bacterial infection  - Evaluated skin lesions on buttocks; one consistent with eczema, another potentially yeast-related  - Determined toe issue likely fungal in nature    B37.31 VAGINA, CANDIDIASIS:  - Educated patient on factors affecting vaginal pH, including alcohol consumption and smoking, and how vaginal dryness and irritation can contribute to discomfort.  - Performed vaginal swab for single-source testing.  - Prescribed fluconazole (Diflucan): 1 dose initially, repeat on day 3, and again 2 days  later (total of 3 doses).  - Advised patient to follow up if symptoms do not improve after completing the medication regimen.          Future Appointments   Date Time Provider Department Center   2/10/2025  7:30 AM [facility]   6/18/2025  8:20 AM Lucero English DNP [facility]       No follow-ups on file. Call sooner if needed.      This note was generated with the assistance of ambient listening technology. Verbal consent was obtained by the patient and accompanying visitor(s) for the recording of patient appointment to facilitate this note. I attest to having reviewed and edited the generated note for accuracy, though some syntax or spelling errors may persist. Please contact the author of this note for any clarification.      Lucero English DNP

## 2025-02-03 LAB
A VAGINAE DNA VAG QL NAA+PROBE: NORMAL SCORE
BVAB2 DNA VAG QL NAA+PROBE: NORMAL SCORE
C ALBICANS DNA VAG QL NAA+PROBE: NEGATIVE
C GLABRATA DNA VAG QL NAA+PROBE: NEGATIVE
C TRACH DNA SPEC QL NAA+PROBE: NEGATIVE
MEGA1 DNA VAG QL NAA+PROBE: NORMAL SCORE
N GONORRHOEA DNA VAG QL NAA+PROBE: NEGATIVE
T VAGINALIS DNA VAG QL NAA+PROBE: NEGATIVE

## 2025-02-04 ENCOUNTER — TELEPHONE (OUTPATIENT)
Dept: FAMILY MEDICINE | Facility: CLINIC | Age: 45
End: 2025-02-04
Payer: COMMERCIAL

## 2025-02-04 NOTE — TELEPHONE ENCOUNTER
----- Message from Lucero English DNP sent at 2/3/2025  5:11 PM CST -----  Notify completely negative for bacterial vaginosis yeast infection trick chlamydia or gonorrhea

## 2025-03-05 ENCOUNTER — RESULTS FOLLOW-UP (OUTPATIENT)
Dept: FAMILY MEDICINE | Facility: CLINIC | Age: 45
End: 2025-03-05
Payer: COMMERCIAL

## 2025-03-10 ENCOUNTER — OFFICE VISIT (OUTPATIENT)
Dept: FAMILY MEDICINE | Facility: CLINIC | Age: 45
End: 2025-03-10
Payer: COMMERCIAL

## 2025-03-10 VITALS
WEIGHT: 117 LBS | HEIGHT: 62 IN | DIASTOLIC BLOOD PRESSURE: 76 MMHG | HEART RATE: 68 BPM | SYSTOLIC BLOOD PRESSURE: 122 MMHG | TEMPERATURE: 98 F | OXYGEN SATURATION: 99 % | BODY MASS INDEX: 21.53 KG/M2 | RESPIRATION RATE: 20 BRPM

## 2025-03-10 DIAGNOSIS — R35.0 URINARY FREQUENCY: ICD-10-CM

## 2025-03-10 DIAGNOSIS — N94.818 VULVAR DISCOMFORT: ICD-10-CM

## 2025-03-10 DIAGNOSIS — E55.9 VITAMIN D DEFICIENCY: ICD-10-CM

## 2025-03-10 DIAGNOSIS — E78.2 MIXED HYPERLIPIDEMIA: Primary | ICD-10-CM

## 2025-03-10 PROCEDURE — 3078F DIAST BP <80 MM HG: CPT | Mod: CPTII,,, | Performed by: NURSE PRACTITIONER

## 2025-03-10 PROCEDURE — 3008F BODY MASS INDEX DOCD: CPT | Mod: CPTII,,, | Performed by: NURSE PRACTITIONER

## 2025-03-10 PROCEDURE — 1160F RVW MEDS BY RX/DR IN RCRD: CPT | Mod: CPTII,,, | Performed by: NURSE PRACTITIONER

## 2025-03-10 PROCEDURE — 3074F SYST BP LT 130 MM HG: CPT | Mod: CPTII,,, | Performed by: NURSE PRACTITIONER

## 2025-03-10 PROCEDURE — 1159F MED LIST DOCD IN RCRD: CPT | Mod: CPTII,,, | Performed by: NURSE PRACTITIONER

## 2025-03-10 PROCEDURE — 99214 OFFICE O/P EST MOD 30 MIN: CPT | Mod: ,,, | Performed by: NURSE PRACTITIONER

## 2025-03-10 RX ORDER — ROSUVASTATIN CALCIUM 10 MG/1
10 TABLET, COATED ORAL NIGHTLY
Qty: 90 TABLET | Refills: 1 | Status: SHIPPED | OUTPATIENT
Start: 2025-03-10

## 2025-03-10 RX ORDER — VIT C/E/ZN/COPPR/LUTEIN/ZEAXAN 250MG-90MG
5000 CAPSULE ORAL DAILY
Qty: 90 CAPSULE | Refills: 0 | Status: SHIPPED | OUTPATIENT
Start: 2025-03-10

## 2025-03-10 NOTE — ASSESSMENT & PLAN NOTE
Currently taking 1000 mg daily tolerating better. Since no side effects with dose taking consistently therapeutic. Currently level 35.

## 2025-03-10 NOTE — ASSESSMENT & PLAN NOTE
Will notify when ready for referral to Dr Bridgett Tejeda for further evaluation since nothing has helped. Still with more discomfort of vulva with intermittent urinary discomfort without infection.

## 2025-03-10 NOTE — PROGRESS NOTES
Patient ID: Barb Telles  : 1980     Chief Complaint: Follow-up (Patient presents to clinic for follow up on labs. )    Allergies: Patient is allergic to amoxicillin-pot clavulanate.     History of Present Illness:  The patient is a 44 y.o. White female who presents to clinic for evaluation and management with a chief complaint of Follow-up (Patient presents to clinic for follow up on labs. )   Still with vaginal discomfort and days with increased urinary urgency. No sign of infection. Had some constipation but now resolved. 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 Hasn't found anything to help symptoms. Tolerating cholesterol medication without problems.          Past Medical History:  has a past medical history of Dysfunction of eustachian tube, Eczema of external auditory canal, High cholesterol, Mixed hyperlipidemia, Other seasonal allergic rhinitis, Skin lesion, and Urinary tract infection.    Social History:  reports that she has been smoking cigarettes and vaping with nicotine. She started smoking about 25 years ago. She has a 25.2 pack-year smoking history. She has been exposed to tobacco smoke. She has never used smokeless tobacco. She reports that she does not currently use alcohol. She reports that she does not use drugs.    Care Team: Patient Care Team:  Lucero English DNP as PCP - General (Family Medicine)  Nikki Keller WHNP as Nurse Practitioner (Obstetrics and Gynecology)     Current Medications:  Current Outpatient Medications   Medication Instructions    betamethasone dipropionate 0.05 % cream Topical (Top), 2 times daily    cholecalciferol (vitamin D3) 5,000 Units, Oral, Daily    clobetasoL (TEMOVATE) 0.05 % cream Topical (Top), 2 times daily    mirabegron (MYRBETRIQ) 25 mg, Oral, Daily    rosuvastatin (CRESTOR) 10 mg, Oral, Nightly    selenium sulfide 2.5 % Lotn Apply to area three times a week    terbinafine HCL (LAMISIL) 1 % cream Topical (Top), 2 times daily  "      Review of Systems   Constitutional:  Negative for activity change, appetite change, chills, fatigue and fever.   HENT: Negative.     Respiratory: Negative.     Cardiovascular: Negative.    Gastrointestinal:  Negative for abdominal distention, abdominal pain, change in bowel habit and nausea.   Genitourinary:  Positive for difficulty urinating, dysuria, frequency, pelvic pain and urgency. Negative for bladder incontinence, decreased urine volume, dyspareunia, enuresis, flank pain, genital sores, hematuria and nocturia.   Musculoskeletal:  Negative for arthralgias.   Allergic/Immunologic: Negative for environmental allergies, food allergies and immunocompromised state.   Neurological: Negative.    Hematological:  Negative for adenopathy.   Psychiatric/Behavioral:  Positive for sleep disturbance. Negative for confusion.         Visit Vitals  /76 (BP Location: Right arm, Patient Position: Sitting)   Pulse 68   Temp 98 °F (36.7 °C)   Resp 20   Ht 5' 2" (1.575 m)   Wt 53.1 kg (117 lb)   LMP 03/08/2025 (Approximate)   SpO2 99%   BMI 21.40 kg/m²       Physical Exam  Constitutional:       Appearance: Normal appearance.   HENT:      Head: Normocephalic.      Nose: Nose normal.      Mouth/Throat:      Mouth: Mucous membranes are dry.   Eyes:      Extraocular Movements: Extraocular movements intact.      Conjunctiva/sclera: Conjunctivae normal.   Cardiovascular:      Rate and Rhythm: Normal rate and regular rhythm.      Pulses: Normal pulses.      Heart sounds: Normal heart sounds.   Pulmonary:      Effort: Pulmonary effort is normal.      Breath sounds: Normal breath sounds.   Abdominal:      General: Bowel sounds are normal.      Palpations: Abdomen is soft.   Musculoskeletal:         General: Normal range of motion.      Cervical back: Normal range of motion.   Skin:     General: Skin is warm and dry.      Capillary Refill: Capillary refill takes less than 2 seconds.   Neurological:      General: No focal deficit " "present.      Mental Status: She is alert and oriented to person, place, and time.   Psychiatric:         Mood and Affect: Mood normal.         Behavior: Behavior normal.          Labs Reviewed:  Chemistry:  Lab Results   Component Value Date     03/03/2025    K 4.2 03/03/2025    BUN 8 03/03/2025    CREATININE 0.55 (L) 03/03/2025    EGFRNORACEVR 117 03/03/2025    CALCIUM 9.5 03/03/2025    ALBUMIN 4.8 03/03/2025    AST 17 03/03/2025    ALT 17 03/03/2025    [id number] 35.8 03/03/2025    TSH 2.960 03/03/2025        No results found for: "HGBA1C", "MICROALBCREA"     Hematology:  Lab Results   Component Value Date    WBC 7.2 03/03/2025    RBC 4.98 03/03/2025    HGB 14.4 03/03/2025    HCT 44.0 03/03/2025    MCV 88 03/03/2025    MCH 28.9 03/03/2025    MCHC 32.7 03/03/2025    RDW 12.8 03/03/2025     03/03/2025    MONO 7 03/03/2025    MONO 0.5 03/03/2025    BASO 0.0 03/03/2025    EOSINOPHIL 6 03/03/2025    BASOPHIL 0 03/03/2025       Lipid Panel:  Lab Results   Component Value Date    CHOL 178 03/03/2025    HDL 48 03/03/2025    LDLCALC 117 (H) 03/03/2025    TRIG 69 03/03/2025        Assessment & Plan:  1. Mixed hyperlipidemia  Overview:  A since last visit was changed from Lipitor to Crestor 10 mg qhs, healthy diet to help with medication compliance in am. Will notify with results of lab draw when available. Continue current treatment until results available.       Assessment & Plan:  Crestor 10 mg working well and not causing GI upset as with higher dose. Plaque to eyelids receeding. Continue current recheck in 6 months.     Orders:  -     rosuvastatin (CRESTOR) 10 MG tablet; Take 1 tablet (10 mg total) by mouth every evening.  Dispense: 90 tablet; Refill: 1    2. Vulvar discomfort  Assessment & Plan:  Will notify when ready for referral to Dr Bridgett Tejeda for further evaluation since nothing has helped. Still with more discomfort of vulva with intermittent urinary discomfort without infection.       3. " Vitamin D deficiency  Assessment & Plan:  Currently taking 1000 mg daily tolerating better. Since no side effects with dose taking consistently therapeutic. Currently level 35.     Orders:  -     cholecalciferol, vitamin D3, 125 mcg (5,000 unit) capsule; Take 1 capsule (5,000 Units total) by mouth once daily.  Dispense: 90 capsule; Refill: 0    4. Urinary frequency  Assessment & Plan:  Myrbetric 25 mg daily but still with symptoms of urgency. Would like to see Dr Tejeda.            Future Appointments   Date Time Provider Department Center   6[facility]18[facility]2025  [facility]   9/8/2025  7:20 AM NURSE, [facility] MEDICINE [facility]       No follow-ups on file. Call sooner if needed.    Lucero English DNP

## 2025-04-23 NOTE — ASSESSMENT & PLAN NOTE
Had stopped taking caffeine and became constipation. Now using coffee again and feeling better.    DISPLAY PLAN FREE TEXT DISPLAY PLAN FREE TEXT

## 2025-07-28 ENCOUNTER — OFFICE VISIT (OUTPATIENT)
Dept: FAMILY MEDICINE | Facility: CLINIC | Age: 45
End: 2025-07-28
Payer: COMMERCIAL

## 2025-07-28 VITALS
DIASTOLIC BLOOD PRESSURE: 82 MMHG | WEIGHT: 117.63 LBS | HEIGHT: 62 IN | TEMPERATURE: 98 F | SYSTOLIC BLOOD PRESSURE: 118 MMHG | BODY MASS INDEX: 21.65 KG/M2 | OXYGEN SATURATION: 99 % | HEART RATE: 82 BPM

## 2025-07-28 DIAGNOSIS — N32.81 OVERACTIVE BLADDER: ICD-10-CM

## 2025-07-28 DIAGNOSIS — Z12.31 SCREENING MAMMOGRAM, ENCOUNTER FOR: ICD-10-CM

## 2025-07-28 DIAGNOSIS — K59.00 CONSTIPATION, UNSPECIFIED CONSTIPATION TYPE: ICD-10-CM

## 2025-07-28 DIAGNOSIS — Z00.01 ANNUAL VISIT FOR GENERAL ADULT MEDICAL EXAMINATION WITH ABNORMAL FINDINGS: Primary | ICD-10-CM

## 2025-07-28 DIAGNOSIS — F17.210 CIGARETTE NICOTINE DEPENDENCE WITHOUT COMPLICATION: ICD-10-CM

## 2025-07-28 DIAGNOSIS — B35.3 TINEA PEDIS OF RIGHT FOOT: ICD-10-CM

## 2025-07-28 DIAGNOSIS — E78.2 MIXED HYPERLIPIDEMIA: ICD-10-CM

## 2025-07-28 DIAGNOSIS — R35.0 URINARY FREQUENCY: ICD-10-CM

## 2025-07-28 DIAGNOSIS — N94.6 DYSMENORRHEA: ICD-10-CM

## 2025-07-28 DIAGNOSIS — E55.9 VITAMIN D DEFICIENCY: ICD-10-CM

## 2025-07-28 LAB
BILIRUB SERPL-MCNC: NEGATIVE MG/DL
BLOOD URINE, POC: NEGATIVE
CLARITY, POC UA: CLEAR
COLOR, POC UA: YELLOW
GLUCOSE UR QL STRIP: NEGATIVE
KETONES UR QL STRIP: NEGATIVE
LEUKOCYTE ESTERASE URINE, POC: NORMAL
NITRITE, POC UA: NEGATIVE
PH, POC UA: 6
PROTEIN, POC: NEGATIVE
SPECIFIC GRAVITY, POC UA: 1.02
UROBILINOGEN, POC UA: NORMAL

## 2025-07-28 PROCEDURE — 3008F BODY MASS INDEX DOCD: CPT | Mod: CPTII,,, | Performed by: NURSE PRACTITIONER

## 2025-07-28 PROCEDURE — 3074F SYST BP LT 130 MM HG: CPT | Mod: CPTII,,, | Performed by: NURSE PRACTITIONER

## 2025-07-28 PROCEDURE — 3079F DIAST BP 80-89 MM HG: CPT | Mod: CPTII,,, | Performed by: NURSE PRACTITIONER

## 2025-07-28 PROCEDURE — 1159F MED LIST DOCD IN RCRD: CPT | Mod: CPTII,,, | Performed by: NURSE PRACTITIONER

## 2025-07-28 PROCEDURE — 1160F RVW MEDS BY RX/DR IN RCRD: CPT | Mod: CPTII,,, | Performed by: NURSE PRACTITIONER

## 2025-07-28 PROCEDURE — 99396 PREV VISIT EST AGE 40-64: CPT | Mod: ,,, | Performed by: NURSE PRACTITIONER

## 2025-07-28 PROCEDURE — 81002 URINALYSIS NONAUTO W/O SCOPE: CPT | Mod: ,,, | Performed by: NURSE PRACTITIONER

## 2025-07-28 RX ORDER — PRENATAL VIT 91/IRON/FOLIC/DHA 28-975-200
COMBINATION PACKAGE (EA) ORAL 2 TIMES DAILY
Qty: 28.4 G | Refills: 1 | Status: SHIPPED | OUTPATIENT
Start: 2025-07-28

## 2025-07-28 RX ORDER — MIRABEGRON 50 MG/1
1 TABLET, FILM COATED, EXTENDED RELEASE ORAL DAILY
Qty: 30 TABLET | Refills: 5 | Status: SHIPPED | OUTPATIENT
Start: 2025-07-28

## 2025-07-28 NOTE — ASSESSMENT & PLAN NOTE
Rosuvastatin 10 mg daily. Will notify with results of lab draw when available. Continue current treatment until results available.

## 2025-07-28 NOTE — ASSESSMENT & PLAN NOTE
Painful menses irregular late by 21 days and then heavy and painful for 4 days. Last month on time but painful and heavy. Awaiting GYN appointment, Dr Tejeda, for extreme dyspareunia, hasn't had intercourse due to pain in past year.

## 2025-07-28 NOTE — ASSESSMENT & PLAN NOTE
Myrbetriq 50 mg daily, 25 some help until recently. Has seen urologist who started but in past and was helping in past.

## 2025-07-28 NOTE — ASSESSMENT & PLAN NOTE
Health Maintenance   Topic Date Due    Pneumococcal Vaccines (Age 0-49) (1 of 2 - PCV) Never done    Mammogram  07/03/2025    HIV Screening  01/17/2030 (Originally 10/19/1995)    Influenza Vaccine (1) 09/01/2025    Cervical Cancer Screening  01/12/2026    TETANUS VACCINE  05/04/2031    RSV Vaccine (Age 60+ and Pregnant patients) (1 - 1-dose 75+ series) 10/19/2056    Hepatitis C Screening  Completed    Lipid Panel  Completed    COVID-19 Vaccine  Discontinued

## 2025-07-28 NOTE — PROGRESS NOTES
Patient ID: Barb Telles  : 1980     Chief Complaint: Annual Exam (Wellness )    Allergies: Patient is allergic to amoxicillin-pot clavulanate.     History of Present Illness:  The patient is a 44 y.o. White female who presents to clinic for evaluation and management with a chief complaint of Annual Exam (Wellness )   History of Present Illness      Here for wellness but more urine discomfort recently, last two days. Has seen urologist but awaiting follow up with genealogist. Taking all medication for cholesterol and vitamin d. Rarely skipping unless forgetting. Otherwise feeling concerned about not understanding painful intercourse. Otherwise feeling doing well. Fasting for lab today.          Past Medical History:  has a past medical history of Dysfunction of eustachian tube, Eczema of external auditory canal, High cholesterol, Mixed hyperlipidemia, Other seasonal allergic rhinitis, Skin lesion, and Urinary tract infection.    Social History:  reports that she has been smoking cigarettes and vaping with nicotine. She started smoking about 25 years ago. She has a 25.6 pack-year smoking history. She has been exposed to tobacco smoke. She has never used smokeless tobacco. She reports that she does not currently use alcohol. She reports that she does not use drugs.    Care Team: Patient Care Team:  Lucero English DNP as PCP - General (Family Medicine)  Nikki Keller WHNP as Nurse Practitioner (Obstetrics and Gynecology)     Current Medications:  Current Outpatient Medications   Medication Instructions    betamethasone dipropionate 0.05 % cream Topical (Top), 2 times daily    cholecalciferol (vitamin D3) 5,000 Units, Oral, Daily    mirabegron (MYRBETRIQ) 50 mg, Oral, Daily, Dose increase call patient at  prior to filling.    rosuvastatin (CRESTOR) 10 mg, Oral, Nightly    selenium sulfide 2.5 % Lotn Apply to area three times a week    terbinafine HCL (LAMISIL) 1 % cream Topical (Top), 2  "times daily       Review of Systems   Genitourinary:  Positive for dyspareunia, dysuria, frequency, urgency and vaginal bleeding. Negative for difficulty urinating, enuresis, flank pain, hematuria and nocturia.   Neurological: Negative.    All other systems reviewed and are negative.       Visit Vitals  /82 (BP Location: Left arm, Patient Position: Sitting)   Pulse 82   Temp 98.1 °F (36.7 °C)   Ht 5' 2" (1.575 m)   Wt 53.3 kg (117 lb 9.6 oz)   LMP 07/13/2025   SpO2 99%   BMI 21.51 kg/m²       Physical Exam  Vitals and nursing note reviewed.   Constitutional:       Appearance: Normal appearance.   HENT:      Head: Normocephalic.      Nose: Nose normal.      Mouth/Throat:      Mouth: Mucous membranes are dry.   Eyes:      Extraocular Movements: Extraocular movements intact.      Conjunctiva/sclera: Conjunctivae normal.        Comments: Decreased bilaterally   Cardiovascular:      Rate and Rhythm: Normal rate and regular rhythm.      Pulses: Normal pulses.   Pulmonary:      Effort: Pulmonary effort is normal.      Breath sounds: Normal breath sounds.   Abdominal:      General: Bowel sounds are normal.      Palpations: Abdomen is soft.   Musculoskeletal:         General: Normal range of motion.      Cervical back: Normal range of motion.   Skin:     General: Skin is warm and dry.      Capillary Refill: Capillary refill takes less than 2 seconds.   Neurological:      General: No focal deficit present.      Mental Status: She is alert.   Psychiatric:         Mood and Affect: Mood normal.          Labs Reviewed:  Chemistry:  Lab Results   Component Value Date     03/03/2025    K 4.2 03/03/2025    BUN 8 03/03/2025    CREATININE 0.55 (L) 03/03/2025    EGFRNORACEVR 117 03/03/2025    CALCIUM 9.5 03/03/2025    ALBUMIN 4.8 03/03/2025    AST 17 03/03/2025    ALT 17 03/03/2025    AGELPWIH16SX 35.8 03/03/2025    TSH 2.960 03/03/2025        No results found for: "HGBA1C", "MICROALBCREA"     Hematology:  Lab Results "   Component Value Date    WBC 7.2 03/03/2025    RBC 4.98 03/03/2025    HGB 14.4 03/03/2025    HCT 44.0 03/03/2025    MCV 88 03/03/2025    MCH 28.9 03/03/2025    MCHC 32.7 03/03/2025    RDW 12.8 03/03/2025     03/03/2025    MONO 7 03/03/2025    MONO 0.5 03/03/2025    BASO 0.0 03/03/2025    EOSINOPHIL 6 03/03/2025    BASOPHIL 0 03/03/2025       Lipid Panel:  Lab Results   Component Value Date    CHOL 178 03/03/2025    HDL 48 03/03/2025    LDLCALC 117 (H) 03/03/2025    TRIG 69 03/03/2025        Assessment & Plan:  1. Annual visit for general adult medical examination with abnormal findings  Assessment & Plan:  Health Maintenance   Topic Date Due    Pneumococcal Vaccines (Age 0-49) (1 of 2 - PCV) Never done    Mammogram  07/03/2025    HIV Screening  01/17/2030 (Originally 10/19/1996)    Influenza Vaccine (1) 09/01/2025    Cervical Cancer Screening  01/12/2026    TETANUS VACCINE  05/04/2031    RSV Vaccine (Age 60+ and Pregnant patients) (1 - 1-dose 75+ series) 10/19/2056    Hepatitis C Screening  Completed    Lipid Panel  Completed    COVID-19 Vaccine  Discontinued        Orders:  -     CBC Auto Differential  -     Comprehensive Metabolic Panel  -     Lipid Panel  -     TSH    2. Screening mammogram, encounter for  -     Mammo Digital Screening Bilat w/ Davin (XPD); Future; Expected date: 07/28/2025    3. Dysmenorrhea  Assessment & Plan:  Painful menses irregular late by 21 days and then heavy and painful for 4 days. Last month on time but painful and heavy. Awaiting GYN appointment, Dr Tejeda, for extreme dyspareunia, hasn't had intercourse due to pain in past year.         4. Constipation, unspecified constipation type  Assessment & Plan:  Caffeine helps more normal Bm diet controlled with more fiber helps symptoms.       5. Cigarette nicotine dependence without complication  Overview:  Declines smoking cessation program or assistance at this time. It is very important that she quit smoking. There are various  alternatives available to help with this difficult task, but first and foremost, she must make a firm commitment and decision to quit. The nature of nicotine addiction is discussed. The usefulness of behavioral therapy is discussed and suggested.  The correct use, cost and side effects of nicotine replacement therapy such as gum or patches is discussed. Bupropion and its cost (sometimes not covered fully by insurance) and side effects are reviewed. The quit rates are discussed. I recommend she not allow potential costs of treatment to deter her from using nicotine replacement therapy or bupropion, as the long term economic and health benefits are obvious.      Assessment & Plan:  Declines smoking cessation currently smoking 10 cigarettes daily. Boredom greatest trigger.       6. Mixed hyperlipidemia  Overview:  A since last visit was changed from Lipitor to Crestor 10 mg qhs, healthy diet to help with medication compliance in am. Will notify with results of lab draw when available. Continue current treatment until results available.       Assessment & Plan:  Rosuvastatin 10 mg daily. Will notify with results of lab draw when available. Continue current treatment until results available.         7. Urinary frequency  Assessment & Plan:  Myrbetriq 50 mg daily, 25 some help until recently. Has seen urologist who started but in past and was helping in past.      Orders:  -     POCT urine dipstick without microscope    8. Vitamin D deficiency  -     Vitamin D    9. Tinea pedis of right foot  -     terbinafine HCL (LAMISIL) 1 % cream; Apply topically 2 (two) times daily.  Dispense: 28.4 g; Refill: 1    10. Overactive bladder  -     mirabegron (MYRBETRIQ) 50 mg Tb24; Take 1 tablet (50 mg total) by mouth once daily. Dose increase call patient at  prior to filling.  Dispense: 30 tablet; Refill: 5         Assessment & Plan               Future Appointments   Date Time Provider Department Center   9/8/2025  7:20 AM  NURSE, [facility] FAMILY MEDICINE [facility] SHIR[facility] Coulter   8/3/2026  9:00 AM Lucero English DNP [facility]       No follow-ups on file. Call sooner if needed.      This note was generated with the assistance of ambient listening technology. Verbal consent was obtained by the patient and accompanying visitor(s) for the recording of patient appointment to facilitate this note. I attest to having reviewed and edited the generated note for accuracy, though some syntax or spelling errors may persist. Please contact the author of this note for any clarification.      Lucero English DNP    Lab Frequency Next Occurrence   Mammo Digital Screening Bilat w/ Davin (XPD) Once 07/28/2025

## 2025-07-29 ENCOUNTER — RESULTS FOLLOW-UP (OUTPATIENT)
Dept: FAMILY MEDICINE | Facility: CLINIC | Age: 45
End: 2025-07-29
Payer: COMMERCIAL

## 2025-07-29 LAB
25(OH)D3+25(OH)D2 SERPL-MCNC: 45.6 NG/ML (ref 30–100)
ALBUMIN SERPL-MCNC: 4.5 G/DL (ref 3.9–4.9)
ALP SERPL-CCNC: 60 IU/L (ref 44–121)
ALT SERPL-CCNC: 11 IU/L (ref 0–32)
AST SERPL-CCNC: 14 IU/L (ref 0–40)
BASOPHILS # BLD AUTO: 0 X10E3/UL (ref 0–0.2)
BASOPHILS NFR BLD AUTO: 0 %
BILIRUB SERPL-MCNC: 0.3 MG/DL (ref 0–1.2)
BUN SERPL-MCNC: 10 MG/DL (ref 6–24)
BUN/CREAT SERPL: 18 (ref 9–23)
CALCIUM SERPL-MCNC: 9.4 MG/DL (ref 8.7–10.2)
CHLORIDE SERPL-SCNC: 107 MMOL/L (ref 96–106)
CHOLEST SERPL-MCNC: 149 MG/DL (ref 100–199)
CO2 SERPL-SCNC: 20 MMOL/L (ref 20–29)
CREAT SERPL-MCNC: 0.56 MG/DL (ref 0.57–1)
EOSINOPHIL # BLD AUTO: 0.4 X10E3/UL (ref 0–0.4)
EOSINOPHIL NFR BLD AUTO: 5 %
ERYTHROCYTE [DISTWIDTH] IN BLOOD BY AUTOMATED COUNT: 12.8 % (ref 11.7–15.4)
GFR SERPLBLD CREATININE-BSD FMLA CKD-EPI: 116 ML/MIN/1.73
GLOBULIN SER CALC-MCNC: 2.1 G/DL (ref 1.5–4.5)
GLUCOSE SERPL-MCNC: 93 MG/DL (ref 70–99)
HCT VFR BLD AUTO: 40.7 % (ref 34–46.6)
HDLC SERPL-MCNC: 47 MG/DL
HGB BLD-MCNC: 13.7 G/DL (ref 11.1–15.9)
IMM GRANULOCYTES NFR BLD AUTO: 0 %
LDLC SERPL CALC-MCNC: 89 MG/DL (ref 0–99)
LYMPHOCYTES # BLD AUTO: 1.6 X10E3/UL (ref 0.7–3.1)
LYMPHOCYTES NFR BLD AUTO: 21 %
MCH RBC QN AUTO: 29.3 PG (ref 26.6–33)
MCHC RBC AUTO-ENTMCNC: 33.7 G/DL (ref 31.5–35.7)
MCV RBC AUTO: 87 FL (ref 79–97)
MONOCYTES # BLD AUTO: 0.4 X10E3/UL (ref 0.1–0.9)
MONOCYTES NFR BLD AUTO: 6 %
NEUTROPHILS # BLD AUTO: 5.2 X10E3/UL (ref 1.4–7)
NEUTROPHILS NFR BLD AUTO: 68 %
PLATELET # BLD AUTO: 195 X10E3/UL (ref 150–450)
POTASSIUM SERPL-SCNC: 4.3 MMOL/L (ref 3.5–5.2)
PROT SERPL-MCNC: 6.6 G/DL (ref 6–8.5)
RBC # BLD AUTO: 4.67 X10E6/UL (ref 3.77–5.28)
SODIUM SERPL-SCNC: 139 MMOL/L (ref 134–144)
TRIGL SERPL-MCNC: 62 MG/DL (ref 0–149)
TSH SERPL DL<=0.005 MIU/L-ACNC: 2.79 UIU/ML (ref 0.45–4.5)
VLDLC SERPL CALC-MCNC: 13 MG/DL (ref 5–40)
WBC # BLD AUTO: 7.7 X10E3/UL (ref 3.4–10.8)

## 2025-07-30 ENCOUNTER — TELEPHONE (OUTPATIENT)
Dept: FAMILY MEDICINE | Facility: CLINIC | Age: 45
End: 2025-07-30
Payer: COMMERCIAL

## 2025-07-30 LAB
BACTERIA UR CULT: NORMAL
BACTERIA UR CULT: NORMAL

## 2025-07-30 NOTE — TELEPHONE ENCOUNTER
----- Message from Lucero English DNP sent at 7/29/2025  6:04 PM CDT -----  Notify cholesterol much improved from last time liver kidney CBC all normal.  Refill rosuvastatin 10 with vitamin-D 5000 recheck CMP FLP vitamin-D in six-month.  ----- Message -----  From: Mechelle Castañeda LPN  Sent: 7/28/2025  11:05 AM CDT  To: Lucero English DNP

## 2025-08-04 ENCOUNTER — TELEPHONE (OUTPATIENT)
Dept: FAMILY MEDICINE | Facility: CLINIC | Age: 45
End: 2025-08-04
Payer: COMMERCIAL

## 2025-08-04 ENCOUNTER — HOSPITAL ENCOUNTER (OUTPATIENT)
Dept: RADIOLOGY | Facility: HOSPITAL | Age: 45
Discharge: HOME | End: 2025-08-04
Attending: NURSE PRACTITIONER
Payer: COMMERCIAL

## 2025-08-04 DIAGNOSIS — N32.81 OVERACTIVE BLADDER: ICD-10-CM

## 2025-08-04 DIAGNOSIS — Z12.31 SCREENING MAMMOGRAM, ENCOUNTER FOR: ICD-10-CM

## 2025-08-04 PROCEDURE — 77063 BREAST TOMOSYNTHESIS BI: CPT | Mod: 26,,, | Performed by: STUDENT IN AN ORGANIZED HEALTH CARE EDUCATION/TRAINING PROGRAM

## 2025-08-04 PROCEDURE — 77067 SCR MAMMO BI INCL CAD: CPT | Mod: 26,,, | Performed by: STUDENT IN AN ORGANIZED HEALTH CARE EDUCATION/TRAINING PROGRAM

## 2025-08-04 PROCEDURE — 77063 BREAST TOMOSYNTHESIS BI: CPT | Mod: TC

## 2025-08-04 RX ORDER — MIRABEGRON 50 MG/1
1 TABLET, FILM COATED, EXTENDED RELEASE ORAL DAILY
Qty: 30 TABLET | Refills: 5 | Status: SHIPPED | OUTPATIENT
Start: 2025-08-04

## 2025-08-04 NOTE — TELEPHONE ENCOUNTER
----- Message from Sarah sent at 8/4/2025  1:40 PM CDT -----  Regarding: Med refill  Needs a refill on Myrbetriq sent to the [facility]

## 2025-08-11 ENCOUNTER — TELEPHONE (OUTPATIENT)
Dept: FAMILY MEDICINE | Facility: CLINIC | Age: 45
End: 2025-08-11
Payer: COMMERCIAL